# Patient Record
Sex: MALE | Race: WHITE | Employment: FULL TIME | URBAN - NONMETROPOLITAN AREA
[De-identification: names, ages, dates, MRNs, and addresses within clinical notes are randomized per-mention and may not be internally consistent; named-entity substitution may affect disease eponyms.]

---

## 2021-08-20 ENCOUNTER — HOSPITAL ENCOUNTER (OUTPATIENT)
Age: 31
Discharge: HOME | End: 2021-08-20
Payer: COMMERCIAL

## 2021-08-20 LAB
HEPATITIS B CORE IGM ANTIBODY: NONREACTIVE
HEPATITIS B SURFACE ANTIGEN: NONREACTIVE
HEPATITIS C ANTIBODY: NONREACTIVE
T. PALLIDUM, IGG: NONREACTIVE

## 2021-08-20 PROCEDURE — 87591 N.GONORRHOEAE DNA AMP PROB: CPT

## 2021-08-20 PROCEDURE — 87522 HEPATITIS C REVRS TRNSCRPJ: CPT

## 2021-08-20 PROCEDURE — 87340 HEPATITIS B SURFACE AG IA: CPT

## 2021-08-20 PROCEDURE — 87389 HIV-1 AG W/HIV-1&-2 AB AG IA: CPT

## 2021-08-20 PROCEDURE — 86705 HEP B CORE ANTIBODY IGM: CPT

## 2021-08-20 PROCEDURE — 86780 TREPONEMA PALLIDUM: CPT

## 2021-08-20 PROCEDURE — 87491 CHLMYD TRACH DNA AMP PROBE: CPT

## 2021-08-20 PROCEDURE — 86803 HEPATITIS C AB TEST: CPT

## 2021-08-20 PROCEDURE — 36415 COLL VENOUS BLD VENIPUNCTURE: CPT

## 2021-08-21 LAB — HIV AG/AB: NONREACTIVE

## 2021-08-23 LAB
C. TRACHOMATIS DNA ,URINE: NEGATIVE
DIRECT EXAM: NORMAL
Lab: NORMAL
N. GONORRHOEAE DNA, URINE: NEGATIVE
SPECIMEN DESCRIPTION: NORMAL
SPECIMEN DESCRIPTION: NORMAL

## 2022-03-14 ENCOUNTER — OFFICE VISIT (OUTPATIENT)
Dept: FAMILY MEDICINE CLINIC | Age: 32
End: 2022-03-14
Payer: COMMERCIAL

## 2022-03-14 VITALS
SYSTOLIC BLOOD PRESSURE: 92 MMHG | OXYGEN SATURATION: 98 % | BODY MASS INDEX: 23.3 KG/M2 | HEIGHT: 66 IN | DIASTOLIC BLOOD PRESSURE: 68 MMHG | WEIGHT: 145 LBS | HEART RATE: 56 BPM | RESPIRATION RATE: 14 BRPM

## 2022-03-14 DIAGNOSIS — G89.29 CHRONIC MIDLINE LOW BACK PAIN WITHOUT SCIATICA: Primary | ICD-10-CM

## 2022-03-14 DIAGNOSIS — M54.50 CHRONIC MIDLINE LOW BACK PAIN WITHOUT SCIATICA: Primary | ICD-10-CM

## 2022-03-14 DIAGNOSIS — I83.812 VARICOSE VEINS OF LEFT LOWER EXTREMITY WITH PAIN: ICD-10-CM

## 2022-03-14 PROCEDURE — 99204 OFFICE O/P NEW MOD 45 MIN: CPT | Performed by: NURSE PRACTITIONER

## 2022-03-14 SDOH — ECONOMIC STABILITY: FOOD INSECURITY: WITHIN THE PAST 12 MONTHS, THE FOOD YOU BOUGHT JUST DIDN'T LAST AND YOU DIDN'T HAVE MONEY TO GET MORE.: NEVER TRUE

## 2022-03-14 SDOH — ECONOMIC STABILITY: FOOD INSECURITY: WITHIN THE PAST 12 MONTHS, YOU WORRIED THAT YOUR FOOD WOULD RUN OUT BEFORE YOU GOT MONEY TO BUY MORE.: NEVER TRUE

## 2022-03-14 ASSESSMENT — PATIENT HEALTH QUESTIONNAIRE - PHQ9
SUM OF ALL RESPONSES TO PHQ QUESTIONS 1-9: 0
2. FEELING DOWN, DEPRESSED OR HOPELESS: 0
1. LITTLE INTEREST OR PLEASURE IN DOING THINGS: 0
SUM OF ALL RESPONSES TO PHQ9 QUESTIONS 1 & 2: 0
SUM OF ALL RESPONSES TO PHQ QUESTIONS 1-9: 0

## 2022-03-14 ASSESSMENT — ENCOUNTER SYMPTOMS: BACK PAIN: 1

## 2022-03-14 ASSESSMENT — SOCIAL DETERMINANTS OF HEALTH (SDOH): HOW HARD IS IT FOR YOU TO PAY FOR THE VERY BASICS LIKE FOOD, HOUSING, MEDICAL CARE, AND HEATING?: NOT HARD AT ALL

## 2022-03-14 NOTE — PATIENT INSTRUCTIONS
SURVEY:    You may be receiving a survey from [employer] regarding your visit today. Please complete the survey to enable us to provide the highest quality of care to you and your family. If you cannot score us a very good on any question, please call the office to discuss how we could of made your experience a very good one. Thank you.
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
## 2022-03-30 ENCOUNTER — HOSPITAL ENCOUNTER (OUTPATIENT)
Dept: GENERAL RADIOLOGY | Age: 32
Discharge: HOME | End: 2022-04-01
Payer: COMMERCIAL

## 2022-03-30 ENCOUNTER — OFFICE VISIT (OUTPATIENT)
Dept: VASCULAR SURGERY | Age: 32
End: 2022-03-30
Payer: COMMERCIAL

## 2022-03-30 ENCOUNTER — HOSPITAL ENCOUNTER (OUTPATIENT)
Age: 32
Discharge: HOME | End: 2022-04-01
Payer: COMMERCIAL

## 2022-03-30 ENCOUNTER — HOSPITAL ENCOUNTER (OUTPATIENT)
Dept: VASCULAR LAB | Age: 32
Discharge: HOME | End: 2022-04-01
Payer: COMMERCIAL

## 2022-03-30 VITALS
HEART RATE: 73 BPM | SYSTOLIC BLOOD PRESSURE: 126 MMHG | DIASTOLIC BLOOD PRESSURE: 60 MMHG | BODY MASS INDEX: 22.8 KG/M2 | RESPIRATION RATE: 18 BRPM | TEMPERATURE: 98.9 F | HEIGHT: 66 IN | WEIGHT: 141.9 LBS

## 2022-03-30 DIAGNOSIS — I83.812 VARICOSE VEINS OF LEFT LOWER EXTREMITY WITH PAIN: ICD-10-CM

## 2022-03-30 DIAGNOSIS — I83.813 VARICOSE VEINS OF BILATERAL LOWER EXTREMITIES WITH PAIN: Primary | ICD-10-CM

## 2022-03-30 DIAGNOSIS — G89.29 CHRONIC MIDLINE LOW BACK PAIN WITHOUT SCIATICA: ICD-10-CM

## 2022-03-30 DIAGNOSIS — M54.50 CHRONIC MIDLINE LOW BACK PAIN WITHOUT SCIATICA: ICD-10-CM

## 2022-03-30 PROCEDURE — 72100 X-RAY EXAM L-S SPINE 2/3 VWS: CPT

## 2022-03-30 PROCEDURE — 93971 EXTREMITY STUDY: CPT

## 2022-03-30 PROCEDURE — 99204 OFFICE O/P NEW MOD 45 MIN: CPT | Performed by: INTERNAL MEDICINE

## 2022-03-30 NOTE — PROGRESS NOTES
Facundo Hacth was seen on 3/30/2022 for   Chief Complaint   Patient presents with    New Patient     Patient here today due to pain in left leg due to varicose veins   . 3/30/22 1st [facility] Vascular visit for VV. Referred by Stephanie Monaco CNP. Has had trouble since 2013. Bulging painful vein. Grandmother had VV. He had stab phlebectomy left LATERAL knee, whereas painful vein is MEDIAL in [facility] in [facility]. No improvement. No persistent improvement with long term (>>3 mo) compression. Vein feels like it's on fire. Interferes with ADL. Interferes with sleep, work, etc.   [employer]  in [address]. On feet a lot. Duplex today showed reflux at LCFV, FV, and pop. There is also reflux at SFJ, THIGH 1389 5.1 MM, KNEE 1694, SSV 6.5 without reflux. AASV prox 5.7  3533, mid 3.1  4700, distal 2.8  4511. The painful vein described by pt at medial knee connects to aasv and is 3.2 mm with 4772  General health is good. Social History     Socioeconomic History    Marital status:      Spouse name: Not on file    Number of children: Not on file    Years of education: Not on file    Highest education level: Not on file   Occupational History    Not on file   Tobacco Use    Smoking status: Current Every Day Smoker     Packs/day: 0.50     Types: Cigarettes    Smokeless tobacco: Never Used   Vaping Use    Vaping Use: Never used   Substance and Sexual Activity    Alcohol use: Never    Drug use: Yes     Types: Marijuana Deboraha Sanes)    Sexual activity: Not on file   Other Topics Concern    Not on file   Social History Narrative    Not on file     Social Determinants of Health     Financial Resource Strain: Low Risk     Difficulty of Paying Living Expenses: Not hard at all   Food Insecurity: No Food Insecurity    Worried About Running Out of Food in the Last Year: Never true    Kahlil of Food in the Last Year: Never true   Transportation Needs:     Lack of Transportation (Medical):  Not on file    Lack of Transportation (Non-Medical): Not on file   Physical Activity:     Days of Exercise per Week: Not on file    Minutes of Exercise per Session: Not on file   Stress:     Feeling of Stress : Not on file   Social Connections:     Frequency of Communication with Friends and Family: Not on file    Frequency of Social Gatherings with Friends and Family: Not on file    Attends Judaism Services: Not on file    Active Member of [address] [place] [address] [employer] or Organizations: Not on file    Attends Club or Organization Meetings: Not on file    Marital Status: Not on file   Intimate Partner Violence:     Fear of Current or Ex-Partner: Not on file    Emotionally Abused: Not on file    Physically Abused: Not on file    Sexually Abused: Not on file   Housing Stability:     Unable to Pay for Housing in the Last Year: Not on file    Number of [place] in the Last Year: Not on file    Unstable Housing in the Last Year: Not on file     Family History   Problem Relation Age of Onset    Cancer Mother     High Blood Pressure Father     Stroke Father      History reviewed. No pertinent past medical history. No current outpatient medications on file. No current facility-administered medications for this visit. Physical findings:  General- no acute distress, oriented  HEENT - no xanthelasma, external ears normal  Neck- Supple, no thyromegaly  Carotids - no carotid bruits  Lungs - Clear to auscultation. CV- Regular rate and rythym, no murmurs rubs or gallops  Abdomen - Non tender, non distended, no bruits  Skin- warm, dry, no skin breakdown or gangrene  Extremities - no edema. Prominent ropey anterior saphenous wrapping medial to knee and extending distally. He described pain over anterior thigh veins and medial knee. Pulses Right - Radial 2+  Posterior tibial:    2+  Dorsalis pedis:  2+     Pulses Left -Radial 2+  Posterior tibial:    2+  Dorsalis pedis:  2+      Assessment:  Encounter Diagnosis   Name Primary?     Varicose veins of bilateral lower extremities with pain Yes     Painful varicose veins interfering with ADL and refractory to compression therapy. Plan foam sclerotherapy left anterior saphenous and branches. 4/12/22  Plan of care:  45 min chart review and pt encounter. Follow up and evaluate. rtc 1 mo       Electronically signed by Olimpia Mckeon MD on 3/30/22 at 1:58 PM EDT

## 2022-03-30 NOTE — PATIENT INSTRUCTIONS
SURVEY:    You may be receiving a survey from [employer] regarding your visit today. Please complete the survey to enable us to provide the highest quality of care to you and your family. If you cannot score us a very good on any question, please call the office to discuss how we could have made your experience a very good one. Thank you.

## 2022-03-30 NOTE — PROGRESS NOTES
Donaldo Pineda was seen on 3/30/2022 for   Chief Complaint   Patient presents with    New Patient     Patient here today due to pain in left leg due to varicose veins   .                             REVIEW OF SYSTEMS    Constitutional Weight: absent, A, Fatigue: absent Fever: absent   HEENT Ears: normal,Visual disturbance: absent Sore throat: absent,    Respiratory Shortness of breath: absent, Cough: absent;, Snoring: absent   Cardivascular Chest pain: absent,  Leg pain: present,Leg swelling:absent, Non-healing wound:absent    GI Diarrhea: absent, Abdominal Pain: absent    Urinary frequency: absent, Urinary incontinence: absent   Musculoskeletal Neck pain: absent, Back pain: absent, Restless Leg:absent     Dermatological Hair loss: absent, Skin changes: absent   Neurological  Sudden Loss of Vision in one eye:absent, Slurred Speech:absent, Weakness on one side of body: absent,Headache: absent   Psychiatric Anxiety: absent, Depression: absent   Hematologic Abnormal bleeding: absent,

## 2022-04-12 ENCOUNTER — APPOINTMENT (OUTPATIENT)
Dept: VASCULAR LAB | Age: 32
End: 2022-04-12
Attending: RADIOLOGY
Payer: COMMERCIAL

## 2022-04-12 ENCOUNTER — HOSPITAL ENCOUNTER (OUTPATIENT)
Dept: INTERVENTIONAL RADIOLOGY/VASCULAR | Age: 32
Discharge: HOME | End: 2022-04-12
Attending: RADIOLOGY | Admitting: INTERNAL MEDICINE
Payer: COMMERCIAL

## 2022-04-12 VITALS
DIASTOLIC BLOOD PRESSURE: 69 MMHG | HEART RATE: 50 BPM | WEIGHT: 140 LBS | OXYGEN SATURATION: 97 % | HEIGHT: 66 IN | SYSTOLIC BLOOD PRESSURE: 133 MMHG | TEMPERATURE: 98.4 F | RESPIRATION RATE: 22 BRPM | BODY MASS INDEX: 22.5 KG/M2

## 2022-04-12 DIAGNOSIS — I83.813 VARICOSE VEINS OF BILATERAL LOWER EXTREMITIES WITH PAIN: ICD-10-CM

## 2022-04-12 DIAGNOSIS — I83.819 VARICOSE VEINS WITH PAIN: ICD-10-CM

## 2022-04-12 PROCEDURE — 36470 NJX SCLRSNT 1 INCMPTNT VEIN: CPT

## 2022-04-12 PROCEDURE — 76942 ECHO GUIDE FOR BIOPSY: CPT

## 2022-04-12 PROCEDURE — 2709999900 IR ULTRASOUND GUIDANCE VASCULAR ACCESS

## 2022-04-12 PROCEDURE — 2500000003 HC RX 250 WO HCPCS

## 2022-04-12 PROCEDURE — 2500000003 HC RX 250 WO HCPCS: Performed by: INTERNAL MEDICINE

## 2022-04-12 RX ORDER — SODIUM TETRADECYL SULFATE 30 MG/ML
INJECTION, SOLUTION INTRAVENOUS
Status: COMPLETED | OUTPATIENT
Start: 2022-04-12 | End: 2022-04-12

## 2022-04-12 RX ORDER — BUPIVACAINE HYDROCHLORIDE 5 MG/ML
INJECTION, SOLUTION EPIDURAL; INTRACAUDAL
Status: COMPLETED | OUTPATIENT
Start: 2022-04-12 | End: 2022-04-12

## 2022-04-12 RX ADMIN — TETRADECYL HYDROGEN SULFATE (ESTER) 2 ML: 30 INJECTION, SOLUTION INTRAVENOUS at 09:10

## 2022-04-12 RX ADMIN — BUPIVACAINE HYDROCHLORIDE 2 ML: 5 INJECTION, SOLUTION EPIDURAL; INTRACAUDAL; PERINEURAL at 09:08

## 2022-04-12 ASSESSMENT — PAIN - FUNCTIONAL ASSESSMENT: PAIN_FUNCTIONAL_ASSESSMENT: 0-10

## 2022-04-12 NOTE — OR NURSING
Doctor discusses results of procedure and instructions with patient. Patient tolerated procedure well.

## 2022-04-12 NOTE — OR NURSING
Ultrasound guided vascular access continues to be attempted by doctor and US tech to left upper thigh area. Patient tolerating procedure well.

## 2022-04-12 NOTE — PROCEDURES
[address], [place] [address]                            CARDIAC CATHETERIZATION    PATIENT NAME: Kraig SKELTON                       :        1990  MED REC NO:   [id number]                              ROOM:  ACCOUNT NO:   [de-identified]                           ADMIT DATE: 2022  PROVIDER:     Sujey Alexander    DATE OF PROCEDURE:  2022    FINAL IMPRESSION:  Successful Sotradecol foam sclerotherapy of the left  anterior saphenous vein. ESTIMATED BLOOD LOSS:  3 mL. PROCEDURE:  Ultrasound-guided foam sclerotherapy. METHOD:  Written informed consent was obtained. The left anterior  saphenous vein was identified under ultrasound and using micropuncture  technique, an inner cannula was introduced into the vein over a  guidewire. An intraluminal location was confirmed by ultrasound and by  aspiration. Sotradecol 3% foam was prepared using the Tessari method  using 2 mL of foam and 8 mL of air prepared using the Tessari technique. This was then injected under ultrasound guidance into the left anterior  saphenous vein. A confirmation of appropriate location of the foam was  performed using ultrasound. After the procedure, the patient was placed  in a compression stocking and was instructed to walk at least 30 minutes  daily. Arrangements were made for followup ultrasound and a clinic  visit. COMMENT:  The patient is a 70-year-old gentleman with severe pain in a  distended left anterior saphenous vein, which interferes with his  activities of daily living. The current procedure was performed in  order to reduce pain.         Elen Foreman    D: 2022 9:35:54       T: 2022 9:39:33     [id number]  Job#: [id number]     Doc#: [id number]    CC:

## 2022-04-25 ENCOUNTER — HOSPITAL ENCOUNTER (OUTPATIENT)
Dept: VASCULAR LAB | Age: 32
Discharge: HOME | End: 2022-04-27
Payer: COMMERCIAL

## 2022-04-25 DIAGNOSIS — I83.813 VARICOSE VEINS OF BILATERAL LOWER EXTREMITIES WITH PAIN: ICD-10-CM

## 2022-04-25 DIAGNOSIS — I83.819 VARICOSE VEINS WITH PAIN: ICD-10-CM

## 2022-04-25 PROCEDURE — 93971 EXTREMITY STUDY: CPT

## 2022-05-09 ENCOUNTER — OFFICE VISIT (OUTPATIENT)
Dept: FAMILY MEDICINE CLINIC | Age: 32
End: 2022-05-09
Payer: COMMERCIAL

## 2022-05-09 VITALS
TEMPERATURE: 98.5 F | DIASTOLIC BLOOD PRESSURE: 65 MMHG | SYSTOLIC BLOOD PRESSURE: 118 MMHG | WEIGHT: 147 LBS | BODY MASS INDEX: 23.63 KG/M2 | HEART RATE: 87 BPM | HEIGHT: 66 IN | OXYGEN SATURATION: 97 % | RESPIRATION RATE: 16 BRPM

## 2022-05-09 DIAGNOSIS — I83.812 VARICOSE VEINS OF LEFT LOWER EXTREMITY WITH PAIN: ICD-10-CM

## 2022-05-09 DIAGNOSIS — M71.329 SYNOVIAL CYST OF ELBOW: ICD-10-CM

## 2022-05-09 DIAGNOSIS — M51.36 DDD (DEGENERATIVE DISC DISEASE), LUMBAR: Primary | ICD-10-CM

## 2022-05-09 PROCEDURE — 99214 OFFICE O/P EST MOD 30 MIN: CPT | Performed by: NURSE PRACTITIONER

## 2022-05-09 ASSESSMENT — ENCOUNTER SYMPTOMS: BACK PAIN: 1

## 2022-05-09 ASSESSMENT — PATIENT HEALTH QUESTIONNAIRE - PHQ9
SUM OF ALL RESPONSES TO PHQ QUESTIONS 1-9: 0
2. FEELING DOWN, DEPRESSED OR HOPELESS: 0
SUM OF ALL RESPONSES TO PHQ QUESTIONS 1-9: 0
SUM OF ALL RESPONSES TO PHQ QUESTIONS 1-9: 0
1. LITTLE INTEREST OR PLEASURE IN DOING THINGS: 0
SUM OF ALL RESPONSES TO PHQ9 QUESTIONS 1 & 2: 0
SUM OF ALL RESPONSES TO PHQ QUESTIONS 1-9: 0

## 2022-05-09 NOTE — PATIENT INSTRUCTIONS
SURVEY:    You may be receiving a survey from [employer] regarding your visit today. Please complete the survey to enable us to provide the highest quality of care to you and your family. If you cannot score us a very good on any question, please call the office to discuss how we could of made your experience a very good one. Thank you.
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
## 2022-05-10 RX ORDER — MELOXICAM 7.5 MG/1
7.5 TABLET ORAL DAILY PRN
Qty: 30 TABLET | Refills: 1 | Status: SHIPPED | OUTPATIENT
Start: 2022-05-10 | End: 2022-06-03

## 2022-05-13 PROBLEM — M51.369 DDD (DEGENERATIVE DISC DISEASE), LUMBAR: Status: ACTIVE | Noted: 2022-05-13

## 2022-05-13 PROBLEM — M51.36 DDD (DEGENERATIVE DISC DISEASE), LUMBAR: Status: ACTIVE | Noted: 2022-05-13

## 2022-05-16 ENCOUNTER — HOSPITAL ENCOUNTER (OUTPATIENT)
Dept: PHYSICAL THERAPY | Age: 32
Setting detail: THERAPIES SERIES
Discharge: HOME | End: 2022-05-16
Payer: COMMERCIAL

## 2022-05-16 PROCEDURE — 97140 MANUAL THERAPY 1/> REGIONS: CPT

## 2022-05-16 PROCEDURE — 97162 PT EVAL MOD COMPLEX 30 MIN: CPT

## 2022-05-16 NOTE — PLAN OF CARE
[facility]           Phone: [phone]             Outpatient Physical Therapy  Fax: [phone]                                           Date: 2022  Patient: Tierra Diaz : 1990 [facility] #: [id number]   Referring Physician: AVNI Murray *  Referral Date:          [x] Plan of Care   [] Updated Plan of Care    Dates of Service to Include: 2022 to 22    Diagnosis:  DDD, lumbar (M51.36)    Rehab (Treatment) Diagnosis:  R-side low back pain             Onset Date:  22    Attendance  Total # of Visits to Date: 1 No Show: 0 Canceled Appointment: 0    Assessment  Assessment: Pt is a 34 y/o male who presents to PT with chronic LBP, which has been progressive in nature. Pt currently demonstrates TTP through R-side lumbar paraspinals and QL. Pt demonstrates functional trunk flex ROM, but evidence of left-side deviation with lumbar extension and minimal restriction with L sidebend compared bilaterally. Pt demonstrates full hip ROM and demonstrates 5/5 hip flex strength bilaterally, 4+/5 hip ext strength bilaterally. Pt reports no significant change in symptoms with manual lumbar traction. Pt wll benefit from skilled PT services to improve trunk ROM, decrease TTP and improve overall functional mobility. Goals  Short Term Goals  Time Frame for Short term goals: 3 weeks  Short term goal 1: Pt will initiate HEP. Short term goal 2: Pt will initiate manual techniques/modalities PRN to assist with improving mobility and decreasing pain. Long Term Goals  Time Frame for Long term goals : 6 weeks  Long term goal 1: Pt will be independent and compliant with HEP. Long term goal 2: Pt will have no TTP through R lumbar paraspinals and QL to decrease soft tissue sensitivity. Long term goal 3: Pt will report >/= 50% improvement in pain and overall function to improve QOL.      Prognosis  Therapy Prognosis: Good    Treatment Plan   Plan Frequency: 1 (Works out of town Tues-Fri.)  Plan weeks: 6  [x] HP/CP      [x] Electrical Stim   [x] Therapeutic Exercise      [] Gait Training  [] Aquatics   [] Ultrasound         [x] Patient Education/HEP   [x] Manual Therapy  [x] Traction    [x] Neuro-mehreen        [x] Soft Tissue Mobs            [] Home TENS  [] Iontophoresis    [] Orthotic casting/fitting      [x] Dry Needling             Electronically signed by: Leny Edgar PT, DPT    Date: 5/16/2022      ______________________________________ Date: 5/16/2022   Physician Signature

## 2022-05-16 NOTE — PROGRESS NOTES
Phone: [address] [facility]          Fax: [phone]                      Outpatient Physical Therapy                                                                      Evaluation  Date: 2022  Patient: Marisol Pinto  : 1990  CSN #: [id number]    Referring Physician: AVNI Capellan *     Medical Diagnosis: DDD, lumbar (M51.36)    Treatment Diagnosis: R-side low back pain  Onset Date: 22  PT Insurance Information: BCBS  Total # of Visits Approved: 6   Total # of Visits to Date: 1  No Show: 0  Canceled Appointment: 0     Subjective  Subjective: Pt states he has had low back pain since ~. Pt states he was in the [employer] for 8-9 years and believes that's when he had a lot of wear and tear on his back. Pt states standing, walking, bending, lifting are all aggravating to low back pain. 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   Additional Pertinent Hx: Anxiety, depression, current smoker    Palpation:   Lumbar Spine Palpation: Mod TTP R lumbar paraspinals and R QL      Strength       Strength LLE  L Hip Flexion: 5/5  L Hip Extension: 4+/5     Lumbar Assessment     AROM Lumbar Spine   Lumbar spine general AROM: Flex WNL; ext indicates deviation to L; sidebend restricted on L when compared bilaterally       Special Tests:   Special Tests Lumbar Spine  Lumbar Special Tests: Performed  Prone Knee Bend Test: R (-),L (-)  SLR: L (-),R (-)                   Strength RLE  R Hip Flexion: 5/5  R Hip Extension: 4+/5  Strength LLE  L Hip Flexion: 5/5  L Hip Extension: 4+/5       Exercises:  Exercise 1: HEP: evens pose, lateral chilsd pose    Manual:  Manual Traction: Manual lumbar traction with BLE's on PB  Soft Tissue Mobilizaton: Manual STM to R-side lumbar paraspinals and QL following IDN  Other: IDN R-side lumbar paraspinals and QL       Modalities:    MHP x10min low back--- seated     Functional Outcome Measures      Self-reported disability= 20%         Assessment  Assessment: Pt is a 32 y/o male who presents to PT with chronic LBP, which has been progressive in nature. Pt currently demonstrates TTP through R-side lumbar paraspinals and QL. Pt demonstrates functional trunk flex ROM, but evidence of left-side deviation with lumbar extension and minimal restriction with L sidebend compared bilaterally. Pt demonstrates full hip ROM and demonstrates 5/5 hip flex strength bilaterally, 4+/5 hip ext strength bilaterally. Pt reports no significant change in symptoms with manual lumbar traction. Pt wll benefit from skilled PT services to improve trunk ROM, decrease TTP and improve overall functional mobility. Therapy Prognosis: Good        Decision Making: Medium Complexity    Patient Education  *Patient Education: PT POC and HEP. Also educated on purpose of IDN. Pt verbalized/demonstrated good understanding:     [X] Yes         [] No, pt required further clarification. Goals  Short Term Goals  Time Frame for Short term goals: 3 weeks  Short term goal 1: Pt will initiate HEP. Short term goal 2: Pt will initiate manual techniques/modalities PRN to assist with improving mobility and decreasing pain. Long Term Goals  Time Frame for Long term goals : 6 weeks  Long term goal 1: Pt will be independent and compliant with HEP. Long term goal 2: Pt will have no TTP through R lumbar paraspinals and QL to decrease soft tissue sensitivity. Long term goal 3: Pt will report >/= 50% improvement in pain and overall function to improve QOL. Patient goals :  \"To be able to make the pain manageable\"        Minutes Tracking:  Time In: 1304  Time Out: 1350  Minutes: 46  Timed Code Treatment Minutes: [address] Nuno Diaz, DPBRITNEY    5/16/2022

## 2022-05-23 ENCOUNTER — HOSPITAL ENCOUNTER (OUTPATIENT)
Dept: PHYSICAL THERAPY | Age: 32
Setting detail: THERAPIES SERIES
Discharge: HOME | End: 2022-05-23
Payer: COMMERCIAL

## 2022-05-23 PROCEDURE — 97140 MANUAL THERAPY 1/> REGIONS: CPT

## 2022-05-23 PROCEDURE — G0283 ELEC STIM OTHER THAN WOUND: HCPCS

## 2022-05-23 PROCEDURE — 97110 THERAPEUTIC EXERCISES: CPT

## 2022-05-23 NOTE — PROGRESS NOTES
Phone: [facility]           Fax: [phone]                           Outpatient Physical Therapy                                                                            Daily Note    Patient: Sivlano Otoole : 1990  CSN #: [id number]   Referring Physician: AVNI Starks *    Date: 2022    Diagnosis: DDD, lumbar (M51.36)  Treatment Diagnosis: R-side low back pain    Onset Date: 22  PT Insurance Information: BCBS  Total # of Visits Approved: 6 Per Physician Order  Total # of Visits to Date: 2  No Show: 0  Canceled Appointment: 0      Pre-Treatment Pain:  4/10  Subjective: Pt states pain level 4/10 upon arrival this date. Pt states he did get some relief following 1st visit. Exercises:  Exercise 1: HEP: evens pose, lateral evens pose  Exercise 2: Evens pose stretch [id number]; lat evens pose stretch  Exercise 3: Seated PB rollouts [id number] ea direction    Manual:  Soft Tissue Mobilizaton: Warm thermoprobe to lumbar paraspinals and R QL following IDN  Other: IDN bilateral lumbar paraspinals    Modalities:   IFC+MHP x15min low back for pain    Assessment  Assessment: Pt reports good feedback following IDN last visit, therefore cont with IDN for cont benefits. Initiated warm thermoprobe following IDN to assist with STM. Reviewed HEP for overall understanding. Initiated IFC and MHP at end of tx for pain. Will progress as tolerated. Will progress as tolerated. Activity Tolerance  Activity Tolerance: Patient tolerated treatment well    Patient Education  *Patient Education: Reviewed manual techniques and HEP  Pt verbalized/demonstrated good understanding:     [x] Yes         [] No, pt required further clarification. Post Treatment Pain:  4/10      Plan  Plan Frequency: 1  Plan weeks: 6       Goals  (Total # of Visits to Date: 2)      Short Term Goals  Time Frame for Short term goals: 3 weeks  Short term goal 1: Pt will initiate HEP. - met  Short term goal 2: Pt will initiate manual techniques/modalities PRN to assist with improving mobility and decreasing pain. - met/cont    Long Term Goals  Time Frame for Long term goals : 6 weeks  Long term goal 1: Pt will be independent and compliant with HEP. Long term goal 2: Pt will have no TTP through R lumbar paraspinals and QL to decrease soft tissue sensitivity. Long term goal 3: Pt will report >/= 50% improvement in pain and overall function to improve QOL.     Minutes Tracking:  Time In: 6105  Time Out: 1034  Minutes: 53  Timed Code Treatment Minutes: [id number], [place], DPT      Date: 5/23/2022

## 2022-06-03 ENCOUNTER — OFFICE VISIT (OUTPATIENT)
Dept: PRIMARY CARE CLINIC | Age: 32
End: 2022-06-03
Payer: COMMERCIAL

## 2022-06-03 ENCOUNTER — APPOINTMENT (OUTPATIENT)
Dept: PHYSICAL THERAPY | Age: 32
End: 2022-06-03
Payer: COMMERCIAL

## 2022-06-03 VITALS
WEIGHT: 146 LBS | DIASTOLIC BLOOD PRESSURE: 78 MMHG | SYSTOLIC BLOOD PRESSURE: 122 MMHG | HEART RATE: 60 BPM | OXYGEN SATURATION: 98 % | HEIGHT: 66 IN | BODY MASS INDEX: 23.46 KG/M2

## 2022-06-03 DIAGNOSIS — L29.9 ITCHING: ICD-10-CM

## 2022-06-03 DIAGNOSIS — L23.7 POISON IVY DERMATITIS: Primary | ICD-10-CM

## 2022-06-03 PROCEDURE — 99213 OFFICE O/P EST LOW 20 MIN: CPT | Performed by: STUDENT IN AN ORGANIZED HEALTH CARE EDUCATION/TRAINING PROGRAM

## 2022-06-03 RX ORDER — PREDNISONE 20 MG/1
20 TABLET ORAL DAILY
Qty: 18 TABLET | Refills: 0 | Status: SHIPPED | OUTPATIENT
Start: 2022-06-03 | End: 2022-06-18

## 2022-06-03 RX ORDER — HYDROXYZINE HYDROCHLORIDE 25 MG/1
25 TABLET, FILM COATED ORAL EVERY 8 HOURS PRN
Qty: 42 TABLET | Refills: 0 | Status: SHIPPED | OUTPATIENT
Start: 2022-06-03 | End: 2022-06-03

## 2022-06-03 RX ORDER — HYDROXYZINE PAMOATE 25 MG/1
25 CAPSULE ORAL 4 TIMES DAILY PRN
Qty: 56 CAPSULE | Refills: 0 | Status: SHIPPED | OUTPATIENT
Start: 2022-06-03 | End: 2022-06-17

## 2022-06-03 RX ORDER — BETAMETHASONE DIPROPIONATE 0.5 MG/G
OINTMENT TOPICAL 2 TIMES DAILY
Qty: 45 G | Refills: 1 | Status: SHIPPED | OUTPATIENT
Start: 2022-06-03 | End: 2022-06-10

## 2022-06-03 RX ORDER — CLOBETASOL PROPIONATE 0.5 MG/G
OINTMENT TOPICAL 2 TIMES DAILY
Qty: 45 G | Refills: 0 | Status: SHIPPED | OUTPATIENT
Start: 2022-06-03 | End: 2022-06-03

## 2022-06-03 NOTE — PROGRESS NOTES
[address], [address] , [facility]  1990 is a 33 y.o. male, Established patient, here for evaluation of the following chief complaint(s):    Poison Ivy       ASSESSMENT/PLAN:      1. Poison ivy dermatitis  -     predniSONE (DELTASONE) 20 MG tablet; Take 1 tablet by mouth daily for 15 days Take 2X20mg tablets (40mg) PO daily for 5 days, followed by 20mg (1) Tablets for 5 days and then 10mg (0.5 tablet) for 5 days Total, Disp-18 tablet, R-0Normal  -     augmented betamethasone dipropionate (DIPROLENE-AF) 0.05 % ointment; Apply topically 2 times daily for 7 days BETHAMETHASONE DP AUG 0.05% OIN alternative, Ok to substitute with what is available and/or per Pharmacist/Insurance preference/coverage please, thank you., Topical, 2 TIMES DAILY Starting Fri 6/3/2022, Until Fri 6/10/2022, For 7 days, Disp-45 g, R-1, Normal  2. Itching  -     hydrOXYzine pamoate (VISTARIL) 25 MG capsule; Take 1 capsule by mouth 4 times daily as needed for Itching, Disp-56 capsule, R-0Normal     Patient is rash today appears to be contact poison ivy dermatitis with some blistering on the inner aspect of the upper arms. We may consider other etiologies as well if there is no resolution of symptoms, including possible biopsy lesions and or dermatology referral if needed. Discussed the risk benefits and alternatives to using these medications with the patient today. Atarax as needed for itching, prednisone with taper, topical clobetasol as needed. Medications Discontinued During This Encounter   Medication Reason    meloxicam (MOBIC) 7.5 MG tablet LIST CLEANUP    clobetasol (TEMOVATE) 0.05 % ointment Cost of medication    hydrOXYzine (ATARAX) 25 MG tablet Cost of medication        Return in about 2 weeks (around 6/17/2022) for F/U Rash. [facility] received counseling on the following healthy behaviors: nutrition, exercise and medication adherence. I encouraged and discussed lifestyle modifications including diet and exercise and the patient was agreeable to making positive/beneficial changes to both to help improve their overall health. Discussed use, benefit, and side effects of prescribed medications. Barriers to medication compliance addressed. Patient given educational materials: see patient instructions. HM - HM items completed today as per orders. Outstanding HM items though not limited to immunizations were discussed with the patient today, including risks, benefits and alternatives. The patient will discuss these during the next appointment per their preference. If there are any worsening or concerning signs or symptoms, patient will report to the ED and/or contact [id number] for immediate evaluation. Teach back method was used. All patient questions answered. Pt voiced understanding. Subjective    SUBJECTIVE/OBJECTIVE:    HPI     Poison Caitlin  for 5 days  Ed Amass of Onset and Mechanism -  sudden, rapid, worsening  Location - rash lotcated bilateral arms , inner thigh and abdomen  Characteristics/Radiation/Quality - the patient was doing yard work on sunday and came in contact with poison ivy. It it very itchy and painful, excoriated and some sites are bleeding at this time. There are some areas of blistering and leakage present. He has noticed an additional area of rash on his abdomen as well. He had one prior episode in childhood. Nobody else around him has the rash present. Severity (0-10) - 10/10 at times  Aggravating Factors - moving around makes it worse  Relieving Factors/Treatment tried - he has tried calimine lotion and oatmeal bath and ivy otc itch meds with minimal relief of his symptoms.     Family History   Problem Relation Age of Onset    Cancer Mother     High Blood Pressure Father     Stroke Father        Health Maintenance   Alcohol/Substance use History - None/Minimal  Smoking History    Tobacco Use      Smoking status: Current Every Day Smoker        Packs/day: 0.50        Types: Cigarettes      Smokeless tobacco: Never Used      Health Maintenance   Topic Date Due    Pneumococcal 0-64 years Vaccine (1 - PCV) Never done    DTaP/Tdap/Td vaccine (7 - Td or Tdap) 03/14/2023 (Originally 8/23/2020)    Flu vaccine (Season Ended) 03/14/2023 (Originally 9/1/2022)    COVID-19 Vaccine (3 - Booster for Pfizer series) 01/01/2024 (Originally 2/11/2022)    Depression Screen  05/09/2023    Hepatitis B vaccine  Completed    Hib vaccine  Completed    Hepatitis C screen  Completed    HIV screen  Completed    Hepatitis A vaccine  Aged Out    Meningococcal (ACWY) vaccine  Aged Out    Varicella vaccine  Discontinued      Depression Screening  PHQ Scores 5/9/2022 3/14/2022   PHQ2 Score 0 0   PHQ9 Score 0 0     Interpretation of Total Score Depression Severity: 1-4 = Minimal depression, 5-9 = Mild depression, 10-14 = Moderate depression, 15-19 = Moderately severe depression, 20-27 = Severe depression      Review of Systems   Constitutional: Negative for activity change, appetite change, chills, diaphoresis, fatigue, fever and unexpected weight change. HENT: Negative for sinus pressure, sinus pain, sore throat and trouble swallowing. Respiratory: Negative for cough, shortness of breath and wheezing. Cardiovascular: Negative for chest pain, palpitations and leg swelling. Gastrointestinal: Negative for abdominal pain, diarrhea, nausea and vomiting. Endocrine: Negative for cold intolerance, polydipsia, polyphagia and polyuria. Genitourinary: Negative for difficulty urinating, flank pain and frequency. Musculoskeletal: Negative for gait problem and joint swelling. Negative for back pain, neck pain and neck stiffness. Skin: Negative for color change and wound. Negative for pallor. Positive for rash. Allergic/Immunologic: Negative for environmental allergies and food allergies.    Neurological: Negative for light-headedness, numbness and headaches. Psychiatric/Behavioral: Negative for sleep disturbance. Negative for confusion and suicidal ideas. Objective    Physical Exam   Constitutional: Patient is oriented to person, place, and time. Patient appears well-developed and well-nourished. No distress. HENT: Head: Normocephalic and atraumatic. Eyes: Pupils are equal, round, and reactive to light. Conjunctivae are normal. Right eye exhibits no discharge. Left eye exhibits no discharge. Cardiovascular: Normal rate, regular rhythm and normal heart sounds. Pulmonary/Chest: Effort normal and breath sounds normal. No respiratory distress. Patient has no wheezes. Abdominal: Soft. Bowel sounds are normal. Patient exhibits no distension. There is no tenderness. Musculoskeletal:  Patient exhibits no edema and tenderness. Patient exhibits no deformity. Neurological: Patient is alert and oriented to person, place, and time. Skin: Skin is warm and dry. Patient is not diaphoretic. Positive for rash, streaky, patchy rashes on the forearms, inner thighs and abdomen. Psychiatric: Patient's speech is normal and behavior is normal. Thought content normal.   Vitals reviewed. /78   Pulse 60   Ht 5' 6\" (1.676 m)   Wt 146 lb (66.2 kg)   SpO2 98%   BMI 23.57 kg/m²   BP Readings from Last 3 Encounters:   06/03/22 122/78   05/09/22 118/65   04/12/22 133/69     No results found for: WBC, HGB, HCT, PLT, CHOL, TRIG, HDL, LDLDIRECT, ALT, AST, NA, K, CL, CREATININE, BUN, CO2, TSH, PSA, INR, GLUF, LABA1C, LABMICR  No results found for: CALCIUM, PHOS  No results found for: LDLCALC, LDLCHOLESTEROL, LDLDIRECT    CURRENT MEDS W/ ASSOC DIAG         Start Date End Date     meloxicam (MOBIC) 7.5 MG tablet  05/10/22  --     Take 1 tablet by mouth daily as needed for Pain . Take with food.      Patient not taking: Reported on 6/3/2022     Associated Diagnoses:  --            Please note that this chart was generated using voice recognition Dragon dictation software. Although every effort was made to ensure the accuracy of this automated transcription, some errors in transcription may have occurred.     Electronically signed by James Guajardo MD on 6/3/22 at 5:30 PM

## 2022-06-06 ENCOUNTER — APPOINTMENT (OUTPATIENT)
Dept: PHYSICAL THERAPY | Age: 32
End: 2022-06-06
Payer: COMMERCIAL

## 2022-06-13 ENCOUNTER — HOSPITAL ENCOUNTER (OUTPATIENT)
Dept: PHYSICAL THERAPY | Age: 32
Setting detail: THERAPIES SERIES
Discharge: HOME | End: 2022-06-13
Payer: COMMERCIAL

## 2022-06-13 PROCEDURE — 97110 THERAPEUTIC EXERCISES: CPT

## 2022-06-13 NOTE — PROGRESS NOTES
Phone: [facility]           Fax: [phone]                           Outpatient Physical Therapy                                                                            Daily Note    Patient: Dorene Yeager : 1990  CSN #: [id number]   Referring Physician: AVNI Snyder *    Date: 2022    Diagnosis: DDD, lumbar (M51.36)  Treatment Diagnosis: R-side low back pain    Onset Date: 22  PT Insurance Information: BCBS  Total # of Visits Approved: 6 Per Physician Order  Total # of Visits to Date: 3  No Show: 0  Canceled Appointment: 0      Pre-Treatment Pain:  0/10  Subjective: Pt denies pain upon arrival, stating his back has been doing well lately. Pt reports compliance with HEP without any issues/concerns. Exercises:  Exercise 1: HEP: evens pose, lateral evens pose; updated SKTC stretch, DKTC stretch  Exercise 2: Evens pose stretch [id number]; lat evens pose stretch  Exercise 3: Seated PB rollouts [id number] ea direction  Exercise 4: SKTC stretch; DKTC stretch      Assessment  Assessment: Pt has completed 3 PT visits to date. Pt is currently independent and compliant with HEP. Pt has no TTP through lumbar paraspinals. Pt reports >50% improvement in pain and overall function since initiation of PT. Reviewed/updated HEP this date. Will now D/C from PT. Activity Tolerance  Activity Tolerance: Patient tolerated treatment well    Patient Education  Patient Education: HEP and plan for D/C  Pt verbalized/demonstrated good understanding:     [x] Yes         [] No, pt required further clarification. Post Treatment Pain:  0/10      Plan  Plan Frequency: 1  Plan weeks: 6       Goals  (Total # of Visits to Date: 3)      Short Term Goals  Time Frame for Short term goals: 3 weeks  Short term goal 1: Pt will initiate HEP. - met  Short term goal 2: Pt will initiate manual techniques/modalities PRN to assist with improving mobility and decreasing pain. - met/cont    Long Term Goals  Time Frame for Long term goals : 6 weeks  Long term goal 1: Pt will be independent and compliant with HEP. - met  Long term goal 2: Pt will have no TTP through R lumbar paraspinals and QL to decrease soft tissue sensitivity. - met  Long term goal 3: Pt will report >/= 50% improvement in pain and overall function to improve QOL. - met    Minutes Tracking:  Time In: 1025  Time Out: [address]  Minutes: 20  Timed Code Treatment Minutes: Nuno Barry DPT      Date: 6/13/2022

## 2022-06-13 NOTE — DISCHARGE SUMMARY
Phone: [facility]          Fax: [phone]                            Outpatient Physical Therapy                                                                    Discharge Summary    Patient: Jessica Clarke  : 1990  CSN #: [id number]   Referring Physician: AVNI Del Angel *   Diagnosis: DDD, lumbar (M51.36)  Treatment Diagnosis: R-side low back pain      Date Treatment Initiated: 22  Date of Last Treatment: 22      PT Visit Information  Onset Date: 22  PT Insurance Information: BCBS  Total # of Visits Approved: 6  Total # of Visits to Date: 3  Plan of Care/Certification Expiration Date: 22  No Show: 0  Canceled Appointment: 0      Frequency/Duration  Plan Frequency: 1 times per week  Plan weeks: 6 weeks      Treatment Received  [x] HP/CP      [x] Electrical Stim   [x] Therapeutic Exercise      [] Gait Training  [] Aquatics   [] Ultrasound         [x] Patient Education/HEP   [x] Manual Therapy  [] Traction    [] Neuro-mehreen        [x] Soft Tissue Mobs            [] Home TENS  [] Iontophoresis    [] Orthotic casting/fitting      [x] Dry Needling    Assessment  Assessment: Pt has completed 3 PT visits to date. Pt is currently independent and compliant with HEP. Pt has no TTP through lumbar paraspinals. Pt reports >50% improvement in pain and overall function since initiation of PT. Reviewed/updated HEP this date. Will now D/C from PT. Goals  Short Term Goals  Time Frame for Short term goals: 3 weeks  Short term goal 1: Pt will initiate HEP. - met  Short term goal 2: Pt will initiate manual techniques/modalities PRN to assist with improving mobility and decreasing pain. - met/cont    Long Term Goals  Time Frame for Long term goals : 6 weeks  Long term goal 1: Pt will be independent and compliant with HEP. - met  Long term goal 2: Pt will have no TTP through R lumbar paraspinals and QL to decrease soft tissue sensitivity. - met  Long term goal 3: Pt will report >/= 50% improvement in pain and overall function to improve QOL. - met      Reason for Discharge  [x] Goals Achieved                        []  Poor Follow Through/Attendance                  [x]  Optimal Function Achieved     []  Patient Discharged Self    []  Hospitalization                         []  Physician discharge      Thank you for this referral      Juan Carlos Monahan, PT, DPT               Date: 6/13/2022

## 2022-09-12 ENCOUNTER — OFFICE VISIT (OUTPATIENT)
Dept: PRIMARY CARE CLINIC | Age: 32
End: 2022-09-12
Payer: COMMERCIAL

## 2022-09-12 ENCOUNTER — HOSPITAL ENCOUNTER (OUTPATIENT)
Age: 32
Setting detail: SPECIMEN
Discharge: HOME | End: 2022-09-12
Payer: COMMERCIAL

## 2022-09-12 VITALS
RESPIRATION RATE: 14 BRPM | TEMPERATURE: 97.3 F | DIASTOLIC BLOOD PRESSURE: 70 MMHG | WEIGHT: 147 LBS | HEART RATE: 69 BPM | OXYGEN SATURATION: 99 % | HEIGHT: 66 IN | BODY MASS INDEX: 23.63 KG/M2 | SYSTOLIC BLOOD PRESSURE: 116 MMHG

## 2022-09-12 DIAGNOSIS — Z00.00 WELLNESS EXAMINATION: Primary | ICD-10-CM

## 2022-09-12 DIAGNOSIS — Z00.00 WELLNESS EXAMINATION: ICD-10-CM

## 2022-09-12 DIAGNOSIS — M51.36 DDD (DEGENERATIVE DISC DISEASE), LUMBAR: ICD-10-CM

## 2022-09-12 DIAGNOSIS — I83.812 VARICOSE VEINS OF LEFT LOWER EXTREMITY WITH PAIN: ICD-10-CM

## 2022-09-12 DIAGNOSIS — R73.09 ELEVATED GLUCOSE: ICD-10-CM

## 2022-09-12 DIAGNOSIS — Z13.220 LIPID SCREENING: ICD-10-CM

## 2022-09-12 LAB
ALT SERPL-CCNC: 21 U/L (ref 5–41)
ANION GAP SERPL CALCULATED.3IONS-SCNC: 10 MMOL/L (ref 9–17)
AST SERPL-CCNC: 17 U/L
BUN BLDV-MCNC: 13 MG/DL (ref 6–20)
BUN/CREAT BLD: 13 (ref 9–20)
CALCIUM SERPL-MCNC: 9.2 MG/DL (ref 8.6–10.4)
CHLORIDE BLD-SCNC: 104 MMOL/L (ref 98–107)
CHOLESTEROL/HDL RATIO: 3.6
CHOLESTEROL: 163 MG/DL
CO2: 27 MMOL/L (ref 20–31)
CREAT SERPL-MCNC: 1.03 MG/DL (ref 0.7–1.2)
ESTIMATED AVERAGE GLUCOSE: 117 MG/DL
GFR AFRICAN AMERICAN: >60 ML/MIN
GFR NON-AFRICAN AMERICAN: >60 ML/MIN
GFR SERPL CREATININE-BSD FRML MDRD: NORMAL ML/MIN/{1.73_M2}
GFR SERPL CREATININE-BSD FRML MDRD: NORMAL ML/MIN/{1.73_M2}
GLUCOSE BLD-MCNC: 99 MG/DL (ref 70–99)
HBA1C MFR BLD: 5.7 % (ref 4–6)
HCT VFR BLD CALC: 44.4 % (ref 40.7–50.3)
HDLC SERPL-MCNC: 45 MG/DL
HEMOGLOBIN: 14.7 G/DL (ref 13–17)
LDL CHOLESTEROL: 104 MG/DL (ref 0–130)
MCH RBC QN AUTO: 31.8 PG (ref 25.2–33.5)
MCHC RBC AUTO-ENTMCNC: 33.1 G/DL (ref 28.4–34.8)
MCV RBC AUTO: 96.1 FL (ref 82.6–102.9)
NRBC AUTOMATED: 0 PER 100 WBC
PDW BLD-RTO: 12.2 % (ref 11.8–14.4)
PLATELET # BLD: 235 K/UL (ref 138–453)
PMV BLD AUTO: 12 FL (ref 8.1–13.5)
POTASSIUM SERPL-SCNC: 4.1 MMOL/L (ref 3.7–5.3)
RBC # BLD: 4.62 M/UL (ref 4.21–5.77)
SODIUM BLD-SCNC: 141 MMOL/L (ref 135–144)
TRIGL SERPL-MCNC: 70 MG/DL
WBC # BLD: 11.2 K/UL (ref 3.5–11.3)

## 2022-09-12 PROCEDURE — 84460 ALANINE AMINO (ALT) (SGPT): CPT

## 2022-09-12 PROCEDURE — 80048 BASIC METABOLIC PNL TOTAL CA: CPT

## 2022-09-12 PROCEDURE — 99213 OFFICE O/P EST LOW 20 MIN: CPT | Performed by: NURSE PRACTITIONER

## 2022-09-12 PROCEDURE — 84450 TRANSFERASE (AST) (SGOT): CPT

## 2022-09-12 PROCEDURE — 83036 HEMOGLOBIN GLYCOSYLATED A1C: CPT

## 2022-09-12 PROCEDURE — 85027 COMPLETE CBC AUTOMATED: CPT

## 2022-09-12 PROCEDURE — 99395 PREV VISIT EST AGE 18-39: CPT | Performed by: NURSE PRACTITIONER

## 2022-09-12 PROCEDURE — 80061 LIPID PANEL: CPT

## 2022-09-12 ASSESSMENT — ENCOUNTER SYMPTOMS
COUGH: 0
WHEEZING: 0
CONSTIPATION: 0
SHORTNESS OF BREATH: 0
SINUS PAIN: 0
DIARRHEA: 0
ABDOMINAL PAIN: 0
RHINORRHEA: 0
SINUS PRESSURE: 0
SORE THROAT: 0
BACK PAIN: 1

## 2022-09-12 NOTE — PATIENT INSTRUCTIONS
SURVEY:    You may be receiving a survey from [employer] regarding your visit today. Please complete the survey to enable us to provide the highest quality of care to you and your family. If you cannot score us a very good on any question, please call the office to discuss how we could of made your experience a very good one. Thank you.

## 2022-09-12 NOTE — PROGRESS NOTES
Name: Magaly Coughlin  : 1990         Chief Complaint:     Chief Complaint   Patient presents with    Pain     Patient states his DDD is doing ok, he did complete PT. States its manageable at the moment. Annual Exam     Patient comes in for wellness, denies any concerns. Varicose Veins     Left lower leg, becoming more prominent. States pain is getting worse. History of Present Illness:      Magaly Coughlin is a 33 y.o.  male who presents with Pain (Patient states his DDD is doing ok, he did complete PT. States its manageable at the moment. ), Annual Exam (Patient comes in for wellness, denies any concerns. ), and Varicose Veins (Left lower leg, becoming more prominent. States pain is getting worse. )      HPI    Wellness: The patient presents for wellness exam. He admits well balanced diet. For exercise he notes \"a lot of lifting\" at work. He denies routine eye exams. He denies routine dental exams. He is vaccinated for covid. Most recent tetanus vaccine unknown, but within 10 years. Current smoking history includes 1/2 PPD. He denies family history of colorectal cancer or prostate cancer. Lumbar DDD:  Upon review of requested records, patient had x-ray of the lumbar spine 3/2015 which showed mild narrowing of disc space L5-S1 which may imply degenerative disc disease. MRI lumbar spine completed 3/2015 showed no focal abnormalities. He completed physical therapy with benefit. Back pain is rated 2-3 on a scale of 10 currently. Pain is worse with activity. He has used epsom salt baths. Varicose Veins:  Following with Dr. Hairston Neither [facility] vascular). Underwent foam sclerotherapy 2022. He admits enlargement in his left leg veins and pain in his left leg. He admits onset of pain that returned gradually after sclerotherapy. He has not been wearing compression stockings routinely. Denies swelling to his left leg. Past Medical History:     No past medical history on file.    Reviewed all health maintenance requirements and ordered appropriate tests  Health Maintenance Due   Topic Date Due    Pneumococcal 0-64 years Vaccine (1 - PCV) Never done    Flu vaccine (1) Never done       Past Surgical History:     Past Surgical History:   Procedure Laterality Date    DENTAL SURGERY      VARICOSE VEIN SURGERY Left 2014        Medications:       Prior to Admission medications    Not on File        Allergies:       Penicillins    Social History:     Tobacco:    reports that he has been smoking cigarettes. He has been smoking an average of .5 packs per day. He has never used smokeless tobacco.  Alcohol:      reports no history of alcohol use. Drug Use:  reports current drug use. Drug: Marijuana Gi Lux). Family History:     Family History   Problem Relation Age of Onset    Cancer Mother     High Blood Pressure Father     Stroke Father        Review of Systems:     Positive and Negative as described in HPI    Review of Systems   Constitutional:  Negative for chills, fatigue, fever and unexpected weight change. HENT:  Negative for congestion, rhinorrhea, sinus pressure, sinus pain and sore throat. Eyes:  Negative for visual disturbance. Respiratory:  Negative for cough, shortness of breath and wheezing. Cardiovascular:  Negative for chest pain and palpitations. Gastrointestinal:  Negative for abdominal pain, constipation and diarrhea. Genitourinary:  Negative for difficulty urinating. Musculoskeletal:  Positive for arthralgias and back pain. Skin:  Negative for rash. Neurological:  Negative for dizziness, light-headedness and headaches. Physical Exam:   Vitals:  /70   Pulse 69   Temp 97.3 °F (36.3 °C)   Resp 14   Ht 5' 6\" (1.676 m)   Wt 147 lb (66.7 kg)   SpO2 99%   BMI 23.73 kg/m²     Physical Exam  Constitutional:       General: He is not in acute distress. Appearance: Normal appearance. He is not ill-appearing or toxic-appearing. HENT:      Head: Normocephalic. Right Ear: External ear normal. There is impacted cerumen. Left Ear: Tympanic membrane, ear canal and external ear normal.      Nose: Nose normal. No congestion or rhinorrhea. Mouth/Throat:      Mouth: Mucous membranes are moist.      Pharynx: No oropharyngeal exudate or posterior oropharyngeal erythema. Cardiovascular:      Rate and Rhythm: Normal rate and regular rhythm. Heart sounds: Normal heart sounds. No murmur heard. Pulmonary:      Effort: Pulmonary effort is normal. No respiratory distress. Breath sounds: Normal breath sounds. No stridor. No wheezing, rhonchi or rales. Abdominal:      General: Abdomen is flat. Bowel sounds are normal. There is no distension. Palpations: Abdomen is soft. There is no mass. Tenderness: There is no abdominal tenderness. There is no guarding. Hernia: No hernia is present. Musculoskeletal:      Cervical back: Neck supple. Lymphadenopathy:      Cervical: No cervical adenopathy. Skin:     General: Skin is warm. Comments: Tortuous, enlarged, tender veins noted left posterior calf and anterior patella/thigh   Neurological:      Mental Status: He is alert and oriented to person, place, and time. Psychiatric:         Mood and Affect: Mood normal.         Behavior: Behavior normal.         Thought Content:  Thought content normal.         Judgment: Judgment normal.       Data:     Lab Results   Component Value Date/Time     09/12/2022 09:15 AM    K 4.1 09/12/2022 09:15 AM     09/12/2022 09:15 AM    CO2 27 09/12/2022 09:15 AM    BUN 13 09/12/2022 09:15 AM    CREATININE 1.03 09/12/2022 09:15 AM    GLUCOSE 99 09/12/2022 09:15 AM    AST 17 09/12/2022 09:15 AM    ALT 21 09/12/2022 09:15 AM     Lab Results   Component Value Date/Time    WBC 11.2 09/12/2022 09:15 AM    RBC 4.62 09/12/2022 09:15 AM    HGB 14.7 09/12/2022 09:15 AM    HCT 44.4 09/12/2022 09:15 AM    MCV 96.1 09/12/2022 09:15 AM    MCH 31.8 09/12/2022 09:15 AM    MCHC 33.1 09/12/2022 09:15 AM    RDW 12.2 09/12/2022 09:15 AM     09/12/2022 09:15 AM    MPV 12.0 09/12/2022 09:15 AM     No results found for: TSH  No results found for: CHOL, LDL, HDL, PSA, LABA1C    Assessment/Plan:      Diagnosis Orders   1. Wellness examination  ALT    AST    Basic Metabolic Panel    CBC    Lipid Panel    Hemoglobin A1C      2. Elevated glucose  Basic Metabolic Panel    Hemoglobin A1C      3. Lipid screening  Lipid Panel      4. DDD (degenerative disc disease), lumbar        5. Varicose veins of left lower extremity with pain            Lumbar DDD:  - Evidence of DDD found on lumbar MRI in 2015  - Patient had benefit with physical therapy  - Patient agreeable to continue conservative treatments such as yoga, PT exercises, ice, heat, OTC pain management  - I also offered referral to pain management. He will consider this and notify office if he is agreeable to this in the future. We may also consider repeating his lumbar MRI as well    Right cerumen impaction:  - Follow-up 1 to 2 weeks for right ear irrigation. Prep information supplied. Varicose veins:  - Follow-up with Dr. Roseanne Kasper. Will forward this note to Dr. Roseanne Kasper for his review   - Patient encouraged to call office for follow-up    Anxiety:  - Patient noted concern with anxiety at the very end of visit. This was not discussed today. First available appointment offered to discuss further. Wellness:  - Counseled on well-balanced diet and routine physical activity  - Encouraged influenza vaccine this fall/early winter  - Offered pneumococcal 23 vaccination as he is a smoker. Patient not interested at this time. VIS information supplied to patient. He will call office to schedule if interested. - Encouraged COVID booster.  - He believes he is UTD tetanus vaccine  - Wellness labs ordered today for completion  - Counseled on smoking cessation. Offered resources such as patches and/or oral medications.   He states he is not interested in resources at this time. Completed Refills   Requested Prescriptions      No prescriptions requested or ordered in this encounter       Orders Placed This Encounter   Procedures    ALT     Standing Status:   Future     Number of Occurrences:   1     Standing Expiration Date:   9/12/2023    AST     Standing Status:   Future     Number of Occurrences:   1     Standing Expiration Date:   4/23/7587    Basic Metabolic Panel     Standing Status:   Future     Number of Occurrences:   1     Standing Expiration Date:   9/12/2023    CBC     Standing Status:   Future     Number of Occurrences:   1     Standing Expiration Date:   9/12/2023    Lipid Panel     Standing Status:   Future     Number of Occurrences:   1     Standing Expiration Date:   9/12/2023    Hemoglobin A1C     Standing Status:   Future     Number of Occurrences:   1     Standing Expiration Date:   9/12/2023          No results found for this visit on 09/12/22. Return in about 6 months (around 3/12/2023), or if symptoms worsen or fail to improve, for DDD + varicose veins f/u .     Electronically signed by AVNI Mane CNP on 09/12/22 at 10:32 AM.

## 2022-09-15 ENCOUNTER — TELEPHONE (OUTPATIENT)
Dept: VASCULAR SURGERY | Age: 32
End: 2022-09-15

## 2022-09-15 NOTE — TELEPHONE ENCOUNTER
Machelle Alberto per Dr. Roseanne Kasper request for a sooner appointment. Left message . Appointment 09/21 at 2:45. Asked patient to call back and confirm.

## 2022-09-27 ENCOUNTER — OFFICE VISIT (OUTPATIENT)
Dept: PRIMARY CARE CLINIC | Age: 32
End: 2022-09-27
Payer: COMMERCIAL

## 2022-09-27 VITALS
DIASTOLIC BLOOD PRESSURE: 76 MMHG | RESPIRATION RATE: 18 BRPM | OXYGEN SATURATION: 97 % | WEIGHT: 145.6 LBS | HEIGHT: 66 IN | SYSTOLIC BLOOD PRESSURE: 122 MMHG | TEMPERATURE: 98.5 F | HEART RATE: 67 BPM | BODY MASS INDEX: 23.4 KG/M2

## 2022-09-27 DIAGNOSIS — F32.A DEPRESSION, UNSPECIFIED DEPRESSION TYPE: ICD-10-CM

## 2022-09-27 DIAGNOSIS — H61.21 RIGHT EAR IMPACTED CERUMEN: Primary | ICD-10-CM

## 2022-09-27 PROCEDURE — 69209 REMOVE IMPACTED EAR WAX UNI: CPT | Performed by: NURSE PRACTITIONER

## 2022-09-27 PROCEDURE — 99213 OFFICE O/P EST LOW 20 MIN: CPT | Performed by: NURSE PRACTITIONER

## 2022-09-27 ASSESSMENT — PATIENT HEALTH QUESTIONNAIRE - PHQ9
SUM OF ALL RESPONSES TO PHQ QUESTIONS 1-9: 18
4. FEELING TIRED OR HAVING LITTLE ENERGY: 3
8. MOVING OR SPEAKING SO SLOWLY THAT OTHER PEOPLE COULD HAVE NOTICED. OR THE OPPOSITE, BEING SO FIGETY OR RESTLESS THAT YOU HAVE BEEN MOVING AROUND A LOT MORE THAN USUAL: 0
SUM OF ALL RESPONSES TO PHQ9 QUESTIONS 1 & 2: 5
SUM OF ALL RESPONSES TO PHQ QUESTIONS 1-9: 18
1. LITTLE INTEREST OR PLEASURE IN DOING THINGS: 3
5. POOR APPETITE OR OVEREATING: 3
10. IF YOU CHECKED OFF ANY PROBLEMS, HOW DIFFICULT HAVE THESE PROBLEMS MADE IT FOR YOU TO DO YOUR WORK, TAKE CARE OF THINGS AT HOME, OR GET ALONG WITH OTHER PEOPLE: 1
2. FEELING DOWN, DEPRESSED OR HOPELESS: 2
6. FEELING BAD ABOUT YOURSELF - OR THAT YOU ARE A FAILURE OR HAVE LET YOURSELF OR YOUR FAMILY DOWN: 2
7. TROUBLE CONCENTRATING ON THINGS, SUCH AS READING THE NEWSPAPER OR WATCHING TELEVISION: 2
3. TROUBLE FALLING OR STAYING ASLEEP: 3
9. THOUGHTS THAT YOU WOULD BE BETTER OFF DEAD, OR OF HURTING YOURSELF: 0

## 2022-09-27 NOTE — PROGRESS NOTES
Name: Susan Urbano  : 1990         Chief Complaint:     Chief Complaint   Patient presents with    Anxiety     Patient states some days are better than others. His anxiety is hit or miss    Ear Fullness     Right ear fullness. History of Present Illness:      Susan Urbano is a 33 y.o.  male who presents with Anxiety (Patient states some days are better than others. His anxiety is hit or miss) and Ear Fullness (Right ear fullness.)      HPI    Anxiety:  The patient admits history of PTSD, anxiety, and depression that he was diagnosed with while in the [facility]. 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 He deployed several times, once into the [place]. He states he will set goals for himself and then \"psych himself out\". He states he has racing thoughts and feeling like his \"mind has been going a million miles a minute\". Admits worrying. He admits troubles staying asleep. He is not taking anything for sleep. He is averaging 3-5 hours of sleep nightly. Denies thoughts of hurting self or others. He does feel like he is letting others down. He states he frequently stands with his back against the wall because he is nervous of people standing behind him ( of [facility]). He admits friend who passed away from overdose in the patient's arms. He admits looking down at his hands and this brings back memories. He denies known triggers. He was previously on a medication but this made him feel \"drugged up\", he is unsure the name. He has been in counseling in the past who taught him deep breathing techniques. He has not been in counseling since he was in the service. Right Ear Irrigation:   Completed by Jessy Tate CMA and VANDANA Souza    Past Medical History:     No past medical history on file. Reviewed all health maintenance requirements and ordered appropriate tests  There are no preventive care reminders to display for this patient.     Past Surgical History:     Past Surgical History:   Procedure Laterality Date    DENTAL SURGERY      VARICOSE VEIN SURGERY Left 2014        Medications:       Prior to Admission medications    Medication Sig Start Date End Date Taking? Authorizing Provider   sertraline (ZOLOFT) 50 MG tablet Take 1 tablet by mouth daily 9/27/22  Yes AVNI Rios CNP        Allergies:       Penicillins    Social History:     Tobacco:    reports that he has been smoking cigarettes. He has been smoking an average of .5 packs per day. He has never used smokeless tobacco.  Alcohol:      reports no history of alcohol use. Drug Use:  reports current drug use. Drug: Marijuana Rao Semen). Family History:     Family History   Problem Relation Age of Onset    Cancer Mother     High Blood Pressure Father     Stroke Father        Review of Systems:     Positive and Negative as described in HPI    Review of Systems   Psychiatric/Behavioral:  Positive for dysphoric mood and sleep disturbance. Negative for suicidal ideas. The patient is nervous/anxious. Physical Exam:   Vitals:  /76 (Site: Right Upper Arm)   Pulse 67   Temp 98.5 °F (36.9 °C)   Resp 18   Ht 5' 6\" (1.676 m)   Wt 145 lb 9.6 oz (66 kg)   SpO2 97%   BMI 23.50 kg/m²     Physical Exam  Constitutional:       General: He is not in acute distress. Appearance: Normal appearance. He is normal weight. He is not ill-appearing or toxic-appearing. Cardiovascular:      Rate and Rhythm: Normal rate and regular rhythm. Heart sounds: Normal heart sounds. No murmur heard. Pulmonary:      Effort: Pulmonary effort is normal. No respiratory distress. Breath sounds: Normal breath sounds. No stridor. No wheezing, rhonchi or rales. Neurological:      Mental Status: He is alert and oriented to person, place, and time. Psychiatric:         Mood and Affect: Mood normal.         Behavior: Behavior normal.         Thought Content:  Thought content normal. Judgment: Judgment normal.       Data:     Lab Results   Component Value Date/Time     09/12/2022 09:15 AM    K 4.1 09/12/2022 09:15 AM     09/12/2022 09:15 AM    CO2 27 09/12/2022 09:15 AM    BUN 13 09/12/2022 09:15 AM    CREATININE 1.03 09/12/2022 09:15 AM    GLUCOSE 99 09/12/2022 09:15 AM    AST 17 09/12/2022 09:15 AM    ALT 21 09/12/2022 09:15 AM     Lab Results   Component Value Date/Time    WBC 11.2 09/12/2022 09:15 AM    RBC 4.62 09/12/2022 09:15 AM    HGB 14.7 09/12/2022 09:15 AM    HCT 44.4 09/12/2022 09:15 AM    MCV 96.1 09/12/2022 09:15 AM    MCH 31.8 09/12/2022 09:15 AM    MCHC 33.1 09/12/2022 09:15 AM    RDW 12.2 09/12/2022 09:15 AM     09/12/2022 09:15 AM    MPV 12.0 09/12/2022 09:15 AM     No results found for: TSH  Lab Results   Component Value Date/Time    CHOL 163 09/12/2022 09:16 AM    HDL 45 09/12/2022 09:16 AM    LABA1C 5.7 09/12/2022 09:15 AM       Assessment/Plan:      Diagnosis Orders   1. Right ear impacted cerumen  AR REMOVAL IMPACTED CERUMEN IRRIGATION/LVG UNILAT      2. Depression, unspecified depression type            Right Cerumen Impaction:   -- Right ear irrigation completed successfully by VANDANA Blackburn and Bennett Prakash CMA    Anxiety, Depression, PTSD:   - Will initiate sertraline 50mg QD  -- Encouraged healthy diet, routine exercise, remaining well-hydrated, 8 hours of sleep nightly, yoga, meditation, and deep breathing exercises  - Reviewed side effects of this medication at length. - Discussed that this medication may take 4 to 6 weeks to see full benefit.   - Discussed that thoughts of hurting self or others is a medical emergency and if experiencing these thoughts, patient should present to ED or call 911.  - Patient is not interest in counseling  -- F/u 3 weeks for zoloft med check     Completed Refills   Requested Prescriptions     Signed Prescriptions Disp Refills    sertraline (ZOLOFT) 50 MG tablet 30 tablet 3     Sig: Take 1 tablet by mouth daily       Orders Placed This Encounter   Procedures    ND REMOVAL IMPACTED CERUMEN IRRIGATION/LVG UNILAT        No results found for this visit on 09/27/22. Return in about 3 weeks (around 10/18/2022), or if symptoms worsen or fail to improve, for sertraline med check .     Electronically signed by AVNI Ross CNP on 09/27/22 at 1:38 PM.

## 2022-10-03 DIAGNOSIS — I83.819 VARICOSE VEINS WITH PAIN: Primary | ICD-10-CM

## 2022-10-24 ENCOUNTER — OFFICE VISIT (OUTPATIENT)
Dept: PRIMARY CARE CLINIC | Age: 32
End: 2022-10-24
Payer: COMMERCIAL

## 2022-10-24 ENCOUNTER — HOSPITAL ENCOUNTER (OUTPATIENT)
Dept: VASCULAR LAB | Age: 32
Discharge: HOME | End: 2022-10-26
Payer: COMMERCIAL

## 2022-10-24 VITALS
SYSTOLIC BLOOD PRESSURE: 102 MMHG | DIASTOLIC BLOOD PRESSURE: 78 MMHG | WEIGHT: 143 LBS | HEART RATE: 67 BPM | TEMPERATURE: 96.8 F | BODY MASS INDEX: 23.08 KG/M2 | OXYGEN SATURATION: 99 %

## 2022-10-24 DIAGNOSIS — F43.10 PTSD (POST-TRAUMATIC STRESS DISORDER): ICD-10-CM

## 2022-10-24 DIAGNOSIS — F41.9 ANXIETY: ICD-10-CM

## 2022-10-24 DIAGNOSIS — I83.819 VARICOSE VEINS WITH PAIN: ICD-10-CM

## 2022-10-24 DIAGNOSIS — F32.A DEPRESSION, UNSPECIFIED DEPRESSION TYPE: Primary | ICD-10-CM

## 2022-10-24 PROCEDURE — 93971 EXTREMITY STUDY: CPT

## 2022-10-24 PROCEDURE — 99213 OFFICE O/P EST LOW 20 MIN: CPT | Performed by: NURSE PRACTITIONER

## 2022-10-24 NOTE — PROGRESS NOTES
Name: Freddie Owens  : 1990         Chief Complaint:     Chief Complaint   Patient presents with    Follow-up     - 3 week med check, sertraline 50 mg daily  - its helping him sleep better but, he is taking it at 7 pm sleep by 9 pm and up by 2 am  - would like to increase the dose to see if that would help to sleep through the night. - denies any side effects           History of Present Illness:      Freddie Owens is a 33 y.o.  male who presents with Follow-up (- 3 week med check, sertraline 50 mg daily/- its helping him sleep better but, he is taking it at 7 pm sleep by 9 pm and up by 2 am/- would like to increase the dose to see if that would help to sleep through the night./- denies any side effects//)      HPI    HPI 10/24/22: The patient presents for anxiety, depression, PTSD follow-up. He was evaluated in office 2022 and initiated on sertraline 50 mg daily. On this date he states that he was not interested in counseling. Today, he states that this medication \"is working\". He denies nightmares. He states \"anxiety and depression are low\". He is taking this medication before bed. This medication helps him to fall asleep. However, he has been waking up around 2am and unable to fall asleep. Denies side effects. He states he \"feels good\". HPI 22: The patient admits history of PTSD, anxiety, and depression that he was diagnosed with while in the [facility]. 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 He deployed several times, once into the [place]. He states he will set goals for himself and then \"psych himself out\". He states he has racing thoughts and feeling like his \"mind has been going a million miles a minute\". Admits worrying. He admits troubles staying asleep. He is not taking anything for sleep. He is averaging 3-5 hours of sleep nightly. Denies thoughts of hurting self or others. He does feel like he is letting others down.  He states he frequently stands with his back against the wall because he is nervous of people standing behind him (hx of [employer]). He admits friend who passed away from overdose in the patient's arms. He admits looking down at his hands and this brings back memories. He denies known triggers. He was previously on a medication but this made him feel \"drugged up\", he is unsure the name. He has been in counseling in the past who taught him deep breathing techniques. He has not been in counseling since he was in the service. Past Medical History:     No past medical history on file. Reviewed all health maintenance requirements and ordered appropriate tests  There are no preventive care reminders to display for this patient. Past Surgical History:     Past Surgical History:   Procedure Laterality Date    DENTAL SURGERY      VARICOSE VEIN SURGERY Left 2014        Medications:       Prior to Admission medications    Medication Sig Start Date End Date Taking? Authorizing Provider   sertraline (ZOLOFT) 50 MG tablet Take 1 tablet by mouth daily 9/27/22  Yes AVNI Lorenzana CNP        Allergies:       Penicillins    Social History:     Tobacco:    reports that he has been smoking cigarettes. He has been smoking an average of .5 packs per day. He has never used smokeless tobacco.  Alcohol:      reports no history of alcohol use. Drug Use:  reports current drug use. Drug: Marijuana Marcus Larsen). Family History:     Family History   Problem Relation Age of Onset    Cancer Mother     High Blood Pressure Father     Stroke Father        Review of Systems:     Positive and Negative as described in HPI    Review of Systems   Psychiatric/Behavioral:  Positive for sleep disturbance. Negative for dysphoric mood. The patient is not nervous/anxious.       Physical Exam:   Vitals:  /78   Pulse 67   Temp 96.8 °F (36 °C)   Wt 143 lb (64.9 kg)   SpO2 99%   BMI 23.08 kg/m²     Physical Exam  Constitutional:       General: He is not in acute distress. Appearance: Normal appearance. He is normal weight. He is not ill-appearing or toxic-appearing. HENT:      Head: Normocephalic. Cardiovascular:      Rate and Rhythm: Normal rate and regular rhythm. Heart sounds: Normal heart sounds. No murmur heard. Pulmonary:      Effort: Pulmonary effort is normal. No respiratory distress. Breath sounds: Normal breath sounds. No stridor. No wheezing, rhonchi or rales. Skin:     General: Skin is warm. Neurological:      Mental Status: He is alert and oriented to person, place, and time. Psychiatric:         Mood and Affect: Mood normal.         Behavior: Behavior normal.         Thought Content: Thought content normal.         Judgment: Judgment normal.       Data:     Lab Results   Component Value Date/Time     09/12/2022 09:15 AM    K 4.1 09/12/2022 09:15 AM     09/12/2022 09:15 AM    CO2 27 09/12/2022 09:15 AM    BUN 13 09/12/2022 09:15 AM    CREATININE 1.03 09/12/2022 09:15 AM    GLUCOSE 99 09/12/2022 09:15 AM    AST 17 09/12/2022 09:15 AM    ALT 21 09/12/2022 09:15 AM     Lab Results   Component Value Date/Time    WBC 11.2 09/12/2022 09:15 AM    RBC 4.62 09/12/2022 09:15 AM    HGB 14.7 09/12/2022 09:15 AM    HCT 44.4 09/12/2022 09:15 AM    MCV 96.1 09/12/2022 09:15 AM    MCH 31.8 09/12/2022 09:15 AM    MCHC 33.1 09/12/2022 09:15 AM    RDW 12.2 09/12/2022 09:15 AM     09/12/2022 09:15 AM    MPV 12.0 09/12/2022 09:15 AM     No results found for: TSH  Lab Results   Component Value Date/Time    CHOL 163 09/12/2022 09:16 AM    HDL 45 09/12/2022 09:16 AM    LABA1C 5.7 09/12/2022 09:15 AM       Assessment/Plan:      Diagnosis Orders   1. Depression, unspecified depression type        2. Anxiety        3. PTSD (post-traumatic stress disorder)            Anxiety, Depression, PTSD:   - Discussed options such as continuing Zoloft 50 mg daily or increasing to Zoloft 100 mg daily.   Patient would like to remain on 50 mg daily.  - He will call office in several weeks with status update or if he feels that he needs to increase his Zoloft to 100 mg daily.  - Patient declines counseling  - Trial low-dose melatonin 5 mg nightly to assist with nighttime awakening. If this does not improve symptoms, notify office  - Follow-up 3/2023 or sooner with any concerns    Completed Refills   Requested Prescriptions      No prescriptions requested or ordered in this encounter       No orders of the defined types were placed in this encounter. No results found for this visit on 10/24/22. Return if symptoms worsen or fail to improve.     Electronically signed by AVNI Newman CNP on 10/24/22 at 9:09 AM.

## 2022-10-24 NOTE — PATIENT INSTRUCTIONS
SURVEY:    You may be receiving a survey from [employer] regarding your visit today. Please complete the survey to enable us to provide the highest quality of care to you and your family. If you cannot score us a very good on any question, please call the office to discuss how we could of made your experience a very good one. Thank you.

## 2022-10-25 DIAGNOSIS — I83.813 VARICOSE VEINS OF BOTH LOWER EXTREMITIES WITH PAIN: Primary | ICD-10-CM

## 2022-12-13 ENCOUNTER — HOSPITAL ENCOUNTER (OUTPATIENT)
Dept: INTERVENTIONAL RADIOLOGY/VASCULAR | Age: 32
Discharge: HOME | End: 2022-12-15
Payer: COMMERCIAL

## 2022-12-13 ENCOUNTER — HOSPITAL ENCOUNTER (OUTPATIENT)
Dept: VASCULAR LAB | Age: 32
Discharge: HOME | End: 2022-12-15
Payer: COMMERCIAL

## 2022-12-13 VITALS
DIASTOLIC BLOOD PRESSURE: 77 MMHG | BODY MASS INDEX: 22.18 KG/M2 | TEMPERATURE: 98.1 F | HEIGHT: 66 IN | RESPIRATION RATE: 22 BRPM | SYSTOLIC BLOOD PRESSURE: 121 MMHG | OXYGEN SATURATION: 97 % | WEIGHT: 138 LBS | HEART RATE: 64 BPM

## 2022-12-13 DIAGNOSIS — M79.662 PAIN OF LEFT LOWER LEG: Primary | ICD-10-CM

## 2022-12-13 DIAGNOSIS — I83.819 VARICOSE VEINS WITH PAIN: ICD-10-CM

## 2022-12-13 DIAGNOSIS — I83.813 VARICOSE VEINS OF BOTH LOWER EXTREMITIES WITH PAIN: ICD-10-CM

## 2022-12-13 PROCEDURE — C1894 INTRO/SHEATH, NON-LASER: HCPCS

## 2022-12-13 PROCEDURE — 2500000003 HC RX 250 WO HCPCS

## 2022-12-13 PROCEDURE — 36475 ENDOVENOUS RF 1ST VEIN: CPT

## 2022-12-13 PROCEDURE — 2500000003 HC RX 250 WO HCPCS: Performed by: INTERNAL MEDICINE

## 2022-12-13 PROCEDURE — 2580000003 HC RX 258: Performed by: INTERNAL MEDICINE

## 2022-12-13 PROCEDURE — 76942 ECHO GUIDE FOR BIOPSY: CPT

## 2022-12-13 PROCEDURE — 36470 NJX SCLRSNT 1 INCMPTNT VEIN: CPT

## 2022-12-13 RX ORDER — MELATONIN 10 MG
10 CAPSULE ORAL NIGHTLY
COMMUNITY

## 2022-12-13 RX ORDER — LIDOCAINE HYDROCHLORIDE 10 MG/ML
INJECTION, SOLUTION EPIDURAL; INFILTRATION; INTRACAUDAL; PERINEURAL
Status: COMPLETED | OUTPATIENT
Start: 2022-12-13 | End: 2022-12-13

## 2022-12-13 RX ORDER — SODIUM TETRADECYL SULFATE 30 MG/ML
INJECTION, SOLUTION INTRAVENOUS
Status: COMPLETED | OUTPATIENT
Start: 2022-12-13 | End: 2022-12-13

## 2022-12-13 RX ADMIN — LIDOCAINE HYDROCHLORIDE 5 ML: 10 INJECTION, SOLUTION EPIDURAL; INFILTRATION; INTRACAUDAL; PERINEURAL at 10:05

## 2022-12-13 RX ADMIN — SODIUM BICARBONATE: 84 INJECTION, SOLUTION INTRAVENOUS at 10:08

## 2022-12-13 RX ADMIN — TETRADECYL HYDROGEN SULFATE (ESTER) 2 ML: 30 INJECTION, SOLUTION INTRAVENOUS at 11:01

## 2022-12-13 NOTE — PROCEDURES
[address], [place] [address]                            CARDIAC CATHETERIZATION    PATIENT NAME: Nicole Maher                       :        1990  MED REC NO:   [id number]                              ROOM:  ACCOUNT NO:   [de-identified]                           ADMIT DATE: 2022  PROVIDER:     Nataliia Amaro    DATE OF PROCEDURE:  2022    FINAL IMPRESSION:  Successful radiofrequency ablation of the left small  saphenous vein and successful foam sclerotherapy of the left anterior  accessory saphenous vein. ESTIMATED BLOOD LOSS:  5 mL. PROCEDURE:  Ultrasound-guided left small saphenous vein radiofrequency  ablation using tumescent anesthesia and left anterior accessory  saphenous vein foam sclerotherapy. METHOD:  Written informed consent was obtained. The patient was placed  in prone position and the left small saphenous vein was identified under  ultrasound. Using micropuncture technique, a 7-Mauritanian sheath was  introduced. The radiofrequency probe was advanced until its tip was 2.5  cm from the saphenopopliteal junction. Tumescent anesthesia was  administered with confirmation of an appropriate decrease in tip  temperature. A double treatment was performed at the initial location  followed by single treatment according to the IFU. The patient was then placed in supine position and the left anterior  accessory saphenous vein was identified. Using micropuncture technique,  a micropuncture inner cannula was introduced into this vessel. Its  location was confirmed using ultrasound. The saphenofemoral junction  was compressed and 2 mL of 3% Sotradecol prepared using the Tessari  method was injected into the vein and massaged distally to the  symptomatic varicose veins in the thigh and knee. Continued compression  was applied at the saphenopopliteal junction. The introducer sheath was  withdrawn.   Adequate hemostasis was achieved. The patient was placed in  a compression stocking and instructed to walk at least 30 minutes daily. There were no complications. COMMENT:  The patient is a 27-year-old gentleman with disabling symptoms  from reflux in the left small saphenous vein and left anterior accessory  saphenous vein. He will be managed with compression and followup  ultrasound.         Sarah Figueroa    D: 12/13/2022 11:29:51       T: 12/13/2022 11:35:17     [email or web address]  Job#: [id number]     Doc#: [id number]    CC:

## 2022-12-13 NOTE — DISCHARGE INSTRUCTIONS
Radiofrequency Sclerotherapy: After Your Procedure  What is sclerotherapy? Sclerotherapy is a treatment to get rid of varicose veins. A chemical is injected along the vessel to numb the area and then radiofrequency treatment is completed. This causes the vein to close. The procedure can take up to 30 minutes. You should be able to walk on your own after the treatment. Follow-up care is a key part of your treatment and safety. Be sure to make and go to all appointments, and call your doctor if you are having problems. It's also a good idea to know your test results and keep a list of the medicines you take. Going home  Be sure you have someone to drive you home. You will be able to return to normal activity as tolerated. Walking is encouraged at regular intervals throughout the day for at least a total of 30 minutes per day. Avoid long periods of sitting or standing. Refrain from strenuous activities or heavy lifting for several days. Resume a regular diet      Wear compression stocking for 1-2 weeks. If necessary you may shower leaving compression hose on and drying thoroughly with hair dryer. If change of stocking is needed, remove soiled stocking and apply clean stocking immediately. Take analgesics as needed for pain. Watch for signs of infection such as:    redness, swelling, drainage and fever. Please notify doctor of these. Follow up vascular studies in 1-2 weeks. When should you call your doctor? You have questions or concerns. Sclerotherapy: After Your Procedure  What is sclerotherapy? Sclerotherapy is a treatment to get rid of varicose veins. A chemical is injected into the varicose vein. This causes the vein to close. The procedure may cause some pain. The doctor will inject a local anesthetic. The chemical may cause burning or cramping for a few minutes at the injection site. The procedure can take up to 30 minutes.  It depends on how many veins are treated and how big they are. You should be able to walk on your own after the treatment. Follow-up care is a key part of your treatment and safety. Be sure to make and go to all appointments, and call your doctor if you are having problems. It's also a good idea to know your test results and keep a list of the medicines you take. Going home  Be sure you have someone to drive you home. You will be given more specific instructions about recovering from your procedure. They will cover things like diet, wound care, follow-up care, driving, and getting back to your normal routine. Resume a regular diet      Wear compression stocking for 1-2 weeks. If necessary you may shower leaving compression hose on and drying thoroughly with hair dryer. If change of stocking is needed, remove soiled stocking and apply clean stocking immediately. (Some chemicals do not require use of stocking. Staff will advise at time of discharge.)  Watch for signs of infection such as:    redness, swelling, drainage and fever. Please notify doctor of these. Follow up vascular studies in 1-2 weeks. When should you call your doctor? You have questions or concerns. Where can you learn more? Go to [email or web address] and sign in to your [employer] account. Enter C494 in the [employer] box to learn more about Sclerotherapy: Before Your Procedure.     If you do not have an account, please click on the Sign Up Now link. © 3210-4564 Healthwise, Incorporated. Care instructions adapted under license by [facility] ([facility]). This care instruction is for use with your licensed healthcare professional. If you have questions about a medical condition or this instruction, always ask your healthcare professional. [address] any warranty or liability for your use of this information.   Content Version: 45.9.843694; Current as of: August 21, 2015

## 2022-12-13 NOTE — PROGRESS NOTES
Discharge instructions reviewed, voices understanding. Bandaids to left lower leg intact. Dressed for home. Ambulates off department unaided, all belongings sent with patient.

## 2022-12-19 ENCOUNTER — HOSPITAL ENCOUNTER (OUTPATIENT)
Dept: VASCULAR LAB | Age: 32
Discharge: HOME | End: 2022-12-21
Payer: COMMERCIAL

## 2022-12-19 DIAGNOSIS — M79.662 PAIN OF LEFT LOWER LEG: ICD-10-CM

## 2022-12-19 DIAGNOSIS — I83.813 VARICOSE VEINS OF BOTH LOWER EXTREMITIES WITH PAIN: ICD-10-CM

## 2022-12-19 PROCEDURE — 93971 EXTREMITY STUDY: CPT

## 2022-12-21 ENCOUNTER — OFFICE VISIT (OUTPATIENT)
Dept: VASCULAR SURGERY | Age: 32
End: 2022-12-21
Payer: COMMERCIAL

## 2022-12-21 VITALS
RESPIRATION RATE: 18 BRPM | DIASTOLIC BLOOD PRESSURE: 60 MMHG | WEIGHT: 148.1 LBS | SYSTOLIC BLOOD PRESSURE: 133 MMHG | HEART RATE: 69 BPM | HEIGHT: 66 IN | BODY MASS INDEX: 23.8 KG/M2 | TEMPERATURE: 97.3 F

## 2022-12-21 DIAGNOSIS — I83.813 VARICOSE VEINS OF BOTH LOWER EXTREMITIES WITH PAIN: Primary | ICD-10-CM

## 2022-12-21 PROCEDURE — 99214 OFFICE O/P EST MOD 30 MIN: CPT | Performed by: INTERNAL MEDICINE

## 2022-12-21 NOTE — PROGRESS NOTES
Laura Chan was seen on 12/21/2022 for   Chief Complaint   Patient presents with    Follow-up     Some bruising from procedure   . 3/30/22 1st [facility] visit for VV. Referred by Franck Christine CNP. Has had trouble since 2013. Bulging painful vein. Grandmother had VV. He had stab phlebectomy left LATERAL knee, whereas painful vein is MEDIAL in [place] in 2014. No improvement. No persistent improvement with long term (>>3 mo) compression. Vein feels like it's on fire. Interferes with ADL. Interferes with sleep, work, etc.   [employer]  in [address]. On feet a lot. Duplex today showed reflux at LCFV, FV, and pop. There is also reflux at SFJ, THIGH 1389 5.1 MM, KNEE 1694, SSV 6.5 without reflux. AASV prox 5.7  3533, mid 3.1  4700, distal 2.8  4511. The painful vein described by pt at medial knee connects to aasv and is 3.2 mm with 4772  General health is good. UPDATE 12/21/22 Often seen in [place] to accomodate work schedle. On 12/13/22 had LSSV RF and LAASV STS. Tough to access laasv with needle initially. Bruising after the procedure. (Probably related). Follow up duplex shows vessel closure ssv and aasv prox and mid thigh. Distal aasv was still open. Overall leg feels better.  Prior treatment of Desire Berg was 4/12/22 with foam.       Social History     Socioeconomic History    Marital status:      Spouse name: Not on file    Number of children: Not on file    Years of education: Not on file    Highest education level: Not on file   Occupational History    Not on file   Tobacco Use    Smoking status: Every Day     Packs/day: 0.50     Types: Cigarettes    Smokeless tobacco: Never   Vaping Use    Vaping Use: Never used   Substance and Sexual Activity    Alcohol use: Never    Drug use: Yes     Types: Marijuana José Luis Jo    Sexual activity: Not on file   Other Topics Concern    Not on file   Social History Narrative    Not on file     Social Determinants of Health     Financial Resource Strain: Low Risk Difficulty of Paying Living Expenses: Not hard at all   Food Insecurity: No Food Insecurity    Worried About Running Out of Food in the Last Year: Never true    Ran Out of Food in the Last Year: Never true   Transportation Needs: Not on file   Physical Activity: Not on file   Stress: Not on file   Social Connections: Not on file   Intimate Partner Violence: Not on file   Housing Stability: Not on file     Family History   Problem Relation Age of Onset    Cancer Mother     High Blood Pressure Father     Stroke Father      Past Medical History:   Diagnosis Date    Depression     PTSD (post-traumatic stress disorder)      Current Outpatient Medications   Medication Sig Dispense Refill    melatonin 10 MG CAPS capsule Take 10 mg by mouth nightly      sertraline (ZOLOFT) 50 MG tablet Take 1 tablet by mouth daily 30 tablet 3     No current facility-administered medications for this visit. Physical findings:  General- no acute distress, oriented  HEENT - no xanthelasma, external ears normal  Neck- Supple, no thyromegaly  Skin- warm, dry, no skin breakdown or gangrene. Mild bruising left ant thigh  Extremities - no edema    Assessment:  1. Varicose veins of both lower extremities with pain       Improved post treatment  Still has distal laasv patent, which can be treated with foam if needed. 30 min chart review and pt encounetter  Plan of care:  Rtc 6 mo      Follow up and evaluate.        Electronically signed by Kalpesh Bro MD on 12/21/22 at 1:01 PM EST

## 2022-12-21 NOTE — PROGRESS NOTES
Casey Cooney was seen on 12/21/2022 for   Chief Complaint   Patient presents with    Follow-up     Some bruising from procedure   .                             REVIEW OF SYSTEMS    Constitutional Weight: absent, A, Fatigue: absent Fever: absent   HEENT Ears: normal,Visual disturbance: absent Sore throat: absent,    Respiratory Shortness of breath: absent, Cough: absent;, Snoring: absent   Cardivascular Chest pain: absent,  Leg pain: absent,Leg swelling:absent, Non-healing wound:absent    GI Diarrhea: absent, Abdominal Pain: absent    Urinary frequency: absent, Urinary incontinence: absent   Musculoskeletal Neck pain: absent, Back pain: absent, Restless Leg:absent     Dermatological Hair loss: absent, Skin changes: absent   Neurological  Sudden Loss of Vision in one eye:absent, Slurred Speech:absent, Weakness on one side of body: absent,Headache: absent   Psychiatric Anxiety: present, Depression: present   Hematologic Abnormal bleeding: absent,

## 2022-12-21 NOTE — PATIENT INSTRUCTIONS
SURVEY:    You may be receiving a survey from [employer] regarding your visit today. Please complete the survey to enable us to provide the highest quality of care to you and your family. If you cannot score us a very good on any question, please call the office to discuss how we could have made your experience a very good one. Thank you.

## 2023-02-27 ENCOUNTER — OFFICE VISIT (OUTPATIENT)
Dept: PRIMARY CARE CLINIC | Age: 33
End: 2023-02-27
Payer: COMMERCIAL

## 2023-02-27 VITALS
TEMPERATURE: 96.8 F | DIASTOLIC BLOOD PRESSURE: 80 MMHG | OXYGEN SATURATION: 99 % | HEART RATE: 66 BPM | WEIGHT: 150 LBS | SYSTOLIC BLOOD PRESSURE: 120 MMHG | RESPIRATION RATE: 18 BRPM | HEIGHT: 66 IN | BODY MASS INDEX: 24.11 KG/M2

## 2023-02-27 DIAGNOSIS — I83.93 ASYMPTOMATIC VARICOSE VEINS OF BOTH LOWER EXTREMITIES: ICD-10-CM

## 2023-02-27 DIAGNOSIS — F41.9 ANXIETY: ICD-10-CM

## 2023-02-27 DIAGNOSIS — M51.36 DDD (DEGENERATIVE DISC DISEASE), LUMBAR: ICD-10-CM

## 2023-02-27 DIAGNOSIS — F43.10 PTSD (POST-TRAUMATIC STRESS DISORDER): ICD-10-CM

## 2023-02-27 DIAGNOSIS — F32.A DEPRESSION, UNSPECIFIED DEPRESSION TYPE: Primary | ICD-10-CM

## 2023-02-27 PROCEDURE — 99214 OFFICE O/P EST MOD 30 MIN: CPT | Performed by: NURSE PRACTITIONER

## 2023-02-27 RX ORDER — SERTRALINE HYDROCHLORIDE 100 MG/1
100 TABLET, FILM COATED ORAL DAILY
Qty: 30 TABLET | Refills: 5 | Status: SHIPPED | OUTPATIENT
Start: 2023-02-27

## 2023-02-27 SDOH — ECONOMIC STABILITY: FOOD INSECURITY: WITHIN THE PAST 12 MONTHS, THE FOOD YOU BOUGHT JUST DIDN'T LAST AND YOU DIDN'T HAVE MONEY TO GET MORE.: NEVER TRUE

## 2023-02-27 SDOH — ECONOMIC STABILITY: HOUSING INSECURITY
IN THE LAST 12 MONTHS, WAS THERE A TIME WHEN YOU DID NOT HAVE A STEADY PLACE TO SLEEP OR SLEPT IN A SHELTER (INCLUDING NOW)?: NO

## 2023-02-27 SDOH — ECONOMIC STABILITY: INCOME INSECURITY: HOW HARD IS IT FOR YOU TO PAY FOR THE VERY BASICS LIKE FOOD, HOUSING, MEDICAL CARE, AND HEATING?: NOT HARD AT ALL

## 2023-02-27 SDOH — ECONOMIC STABILITY: FOOD INSECURITY: WITHIN THE PAST 12 MONTHS, YOU WORRIED THAT YOUR FOOD WOULD RUN OUT BEFORE YOU GOT MONEY TO BUY MORE.: NEVER TRUE

## 2023-02-27 ASSESSMENT — PATIENT HEALTH QUESTIONNAIRE - PHQ9
6. FEELING BAD ABOUT YOURSELF - OR THAT YOU ARE A FAILURE OR HAVE LET YOURSELF OR YOUR FAMILY DOWN: 0
SUM OF ALL RESPONSES TO PHQ QUESTIONS 1-9: 0
SUM OF ALL RESPONSES TO PHQ9 QUESTIONS 1 & 2: 0
3. TROUBLE FALLING OR STAYING ASLEEP: 0
8. MOVING OR SPEAKING SO SLOWLY THAT OTHER PEOPLE COULD HAVE NOTICED. OR THE OPPOSITE, BEING SO FIGETY OR RESTLESS THAT YOU HAVE BEEN MOVING AROUND A LOT MORE THAN USUAL: 0
2. FEELING DOWN, DEPRESSED OR HOPELESS: 0
SUM OF ALL RESPONSES TO PHQ QUESTIONS 1-9: 0
1. LITTLE INTEREST OR PLEASURE IN DOING THINGS: 0
SUM OF ALL RESPONSES TO PHQ QUESTIONS 1-9: 0
5. POOR APPETITE OR OVEREATING: 0
7. TROUBLE CONCENTRATING ON THINGS, SUCH AS READING THE NEWSPAPER OR WATCHING TELEVISION: 0
4. FEELING TIRED OR HAVING LITTLE ENERGY: 0
9. THOUGHTS THAT YOU WOULD BE BETTER OFF DEAD, OR OF HURTING YOURSELF: 0
SUM OF ALL RESPONSES TO PHQ QUESTIONS 1-9: 0
10. IF YOU CHECKED OFF ANY PROBLEMS, HOW DIFFICULT HAVE THESE PROBLEMS MADE IT FOR YOU TO DO YOUR WORK, TAKE CARE OF THINGS AT HOME, OR GET ALONG WITH OTHER PEOPLE: 0

## 2023-02-27 NOTE — PATIENT INSTRUCTIONS
SURVEY:    You may be receiving a survey from [employer] regarding your visit today. Please complete the survey to enable us to provide the highest quality of care to you and your family. If you cannot score us a very good on any question, please call the office to discuss how we could of made your experience a very good one. Thank you.

## 2023-02-27 NOTE — PROGRESS NOTES
Name: Theo Mckeon  : 1990         Chief Complaint:     Chief Complaint   Patient presents with    Varicose Veins     Following with Dr. Shara Trevizo ([facility]). Most recent appointment 2022 with vascular, chart review states the following: \"22 had LSSV RF and LAASV STS. Tough to access laasv with needle initially. Bruising after the procedure. (Probably related). Follow up duplex shows vessel closure ssv and aasv prox and mid thigh. Distal aasv was still open. Overall leg feels better. Prior treatment of [employer] was 22 with foam.\". Today, \" doing good\", states doing a lot better    Other     DDD:Upon review of requested records, patient had x-ray of the lumbar spine 3/2015 which showed mild narrowing of disc space L5-S1 which may imply degenerative disc disease. MRI lumbar spine completed 3/2015 showed no focal abnormalities. Lumbar XR 3/2022 showed minimal loss of disc space height at the lumbosacral junction. He completed several sessions of PT 2022 - 2022. Today, denies any issues. States its \"fine\"       Medication Check     Sertraline 50 mg, states its not helping with his sleep. Noticing more irritation, only sleeping 4-5 hours. History of Present Illness:      Theo Mckeon is a 27 y.o.  male who presents with Varicose Veins (Following with Dr. Shara Trevizo ([facility]). Most recent appointment 2022 with vascular, chart review states the following: \"22 had LSSV RF and LAASV STS. Tough to access laasv with needle initially. Bruising after the procedure. (Probably related). Follow up duplex shows vessel closure ssv and aasv prox and mid thigh. Distal aasv was still open. Overall leg feels better. Prior treatment of [employer] was 22 with foam.\".  Today, \" doing good\", states doing a lot better), Other (DDD:Upon review of requested records, patient had x-ray of the lumbar spine 3/2015 which showed mild narrowing of disc space L5-S1 which may imply degenerative disc disease. MRI lumbar spine completed 3/2015 showed no focal abnormalities. Lumbar XR 3/2022 showed minimal loss of disc space height at the lumbosacral junction. He completed several sessions of PT 5/2022 - 6/2022. Today, denies any issues. States its \"fine\" /), and Medication Check (Sertraline 50 mg, states its not helping with his sleep. Noticing more irritation, only sleeping 4-5 hours. )      HPI    Depression, Anxiety, PTSD:  Current treatment includes zoloft 50mg QD. Overall, mood described as having increased irritability. He is taking this medication before bed. He is sleeping only 4-5 hours nightly. He is having troubles staying asleep. Denies waking up to urinate. He believes that he does snore. Admits hand twitching, mostly at night. Denies racing thoughts that keep him awake at night. Denies thoughts of hurting self or others. He denies interest in counseling at this time, as he states he completed this through the [facility] with minimal relief. He is taking melatonin 5mg QHS. DDD:  Upon review of requested records, patient had x-ray of the lumbar spine 3/2015 which showed mild narrowing of disc space L5-S1 which may imply degenerative disc disease. MRI lumbar spine completed 3/2015 showed no focal abnormalities. Lumbar XR 3/2022 showed minimal loss of disc space height at the lumbosacral junction. He completed several sessions of PT 5/2022 - 6/2022. Today, he states his back pain is \"doing good\". He is doing PT exercises BID 5x per week. Denies concerns with lumbar ROM. Varicose Veins:  [facility] Earl [facility] Most recent appointment 12/21/2022 with vascular, chart review states the following: \"22 had LSSV RF and LAASV STS. Tough to access laasv with needle initially. Bruising after the procedure. (Probably related). Follow up duplex shows vessel closure ssv and aasv prox and mid thigh. Distal aasv was still open. Overall leg feels better.  Prior treatment of Oralia Baca was 4/12/22 with foam\". Today, he states his leg pain has completely resolved. He is scheduled to f/u with vascular 5/2023. Past Medical History:     Past Medical History:   Diagnosis Date    Depression     PTSD (post-traumatic stress disorder)       Reviewed all health maintenance requirements and ordered appropriate tests  There are no preventive care reminders to display for this patient. Past Surgical History:     Past Surgical History:   Procedure Laterality Date    DENTAL SURGERY      VARICOSE VEIN SURGERY Left 2014        Medications:       Prior to Admission medications    Medication Sig Start Date End Date Taking? Authorizing Provider   sertraline (ZOLOFT) 100 MG tablet Take 1 tablet by mouth daily 2/27/23  Yes AVNI Loredo CNP   melatonin 10 MG CAPS capsule Take 10 mg by mouth nightly   Yes Historical Provider, MD        Allergies:       Penicillins    Social History:     Tobacco:    reports that he has been smoking cigarettes. He has been smoking an average of .5 packs per day. He has never used smokeless tobacco.  Alcohol:      reports no history of alcohol use. Drug Use:  reports current drug use. Drug: Marijuana [place] Percy). Family History:     Family History   Problem Relation Age of Onset    Cancer Mother     High Blood Pressure Father     Stroke Father        Review of Systems:     Positive and Negative as described in HPI    Review of Systems   Psychiatric/Behavioral:  Positive for agitation and sleep disturbance. Physical Exam:   Vitals:  /80   Pulse 66   Temp 96.8 °F (36 °C)   Resp 18   Ht 5' 6\" (1.676 m)   Wt 150 lb (68 kg)   SpO2 99%   BMI 24.21 kg/m²     Physical Exam  Constitutional:       General: He is not in acute distress. Appearance: Normal appearance. He is normal weight. He is not ill-appearing or toxic-appearing. Cardiovascular:      Rate and Rhythm: Normal rate and regular rhythm. Heart sounds: Normal heart sounds.  No murmur heard.  Pulmonary:      Effort: Pulmonary effort is normal. No respiratory distress. Breath sounds: Normal breath sounds. No stridor. No wheezing, rhonchi or rales. Neurological:      Mental Status: He is alert. Psychiatric:         Mood and Affect: Mood normal.         Behavior: Behavior normal.         Thought Content: Thought content normal.       Data:     Lab Results   Component Value Date/Time     09/12/2022 09:15 AM    K 4.1 09/12/2022 09:15 AM     09/12/2022 09:15 AM    CO2 27 09/12/2022 09:15 AM    BUN 13 09/12/2022 09:15 AM    CREATININE 1.03 09/12/2022 09:15 AM    GLUCOSE 99 09/12/2022 09:15 AM    AST 17 09/12/2022 09:15 AM    ALT 21 09/12/2022 09:15 AM     Lab Results   Component Value Date/Time    WBC 11.2 09/12/2022 09:15 AM    RBC 4.62 09/12/2022 09:15 AM    HGB 14.7 09/12/2022 09:15 AM    HCT 44.4 09/12/2022 09:15 AM    MCV 96.1 09/12/2022 09:15 AM    MCH 31.8 09/12/2022 09:15 AM    MCHC 33.1 09/12/2022 09:15 AM    RDW 12.2 09/12/2022 09:15 AM     09/12/2022 09:15 AM    MPV 12.0 09/12/2022 09:15 AM     No results found for: TSH  Lab Results   Component Value Date/Time    CHOL 163 09/12/2022 09:16 AM    HDL 45 09/12/2022 09:16 AM    LABA1C 5.7 09/12/2022 09:15 AM       Assessment/Plan:      Diagnosis Orders   1. Depression, unspecified depression type        2. Anxiety        3. PTSD (post-traumatic stress disorder)        4. DDD (degenerative disc disease), lumbar        5. Asymptomatic varicose veins of both lower extremities            Varicose veins:  - Stable, asymptomatic   - Continue following with vascular Jessica Villalba, upcoming appointment 5/2023    DDD:  - Stable  - Continue physical therapy exercises at home    Insomnia, depression, anxiety, PTSD:  - Unstable  - Increase Zoloft from 50 mg to 100 mg daily.   Reviewed side effects.  - Follow-up 4 weeks or sooner with any concerns  - Offered counseling, patient states he has done this in the past with little relief  - Encouraged healthy diet, routine exercise, aiming for 8 hours of sleep nightly, yoga, meditation, deep breathing  -- Discussed his sleep concerns. Increase zoloft to assist with better controlling mood. Continue melatonin 5mg QHS. Consider adding trazodone or hydroxyzine. F/u 4 weeks. Completed Refills   Requested Prescriptions     Signed Prescriptions Disp Refills    sertraline (ZOLOFT) 100 MG tablet 30 tablet 5     Sig: Take 1 tablet by mouth daily       No orders of the defined types were placed in this encounter. No results found for this visit on 02/27/23. Return in about 4 weeks (around 3/27/2023), or if symptoms worsen or fail to improve, for zoloft medcheck .     Electronically signed by AVNI Perry CNP on 02/27/23 at 9:36 AM. Graft Donor Site Bandage (Optional-Leave Blank If You Don't Want In Note): Pressure bandage were applied to the donor site.

## 2023-03-22 RX ORDER — SERTRALINE HYDROCHLORIDE 100 MG/1
TABLET, FILM COATED ORAL
Qty: 30 TABLET | Refills: 5 | Status: SHIPPED | OUTPATIENT
Start: 2023-03-22

## 2023-03-27 ENCOUNTER — OFFICE VISIT (OUTPATIENT)
Dept: PRIMARY CARE CLINIC | Age: 33
End: 2023-03-27
Payer: COMMERCIAL

## 2023-03-27 VITALS
HEIGHT: 66 IN | TEMPERATURE: 98.6 F | SYSTOLIC BLOOD PRESSURE: 104 MMHG | DIASTOLIC BLOOD PRESSURE: 70 MMHG | RESPIRATION RATE: 18 BRPM | HEART RATE: 85 BPM | WEIGHT: 152 LBS | BODY MASS INDEX: 24.43 KG/M2 | OXYGEN SATURATION: 98 %

## 2023-03-27 DIAGNOSIS — F43.10 PTSD (POST-TRAUMATIC STRESS DISORDER): ICD-10-CM

## 2023-03-27 DIAGNOSIS — R73.03 PREDIABETES: ICD-10-CM

## 2023-03-27 DIAGNOSIS — Z00.00 WELLNESS EXAMINATION: ICD-10-CM

## 2023-03-27 DIAGNOSIS — Z13.220 LIPID SCREENING: ICD-10-CM

## 2023-03-27 DIAGNOSIS — F41.9 ANXIETY: Primary | ICD-10-CM

## 2023-03-27 DIAGNOSIS — F32.A DEPRESSION, UNSPECIFIED DEPRESSION TYPE: ICD-10-CM

## 2023-03-27 PROCEDURE — 99213 OFFICE O/P EST LOW 20 MIN: CPT | Performed by: NURSE PRACTITIONER

## 2023-03-27 RX ORDER — SERTRALINE HYDROCHLORIDE 100 MG/1
100 TABLET, FILM COATED ORAL DAILY
Qty: 90 TABLET | Refills: 3 | Status: SHIPPED | OUTPATIENT
Start: 2023-03-27

## 2023-03-27 NOTE — PROGRESS NOTES
of Onset    Cancer Mother     High Blood Pressure Father     Stroke Father        Review of Systems:     Positive and Negative as described in HPI    Review of Systems   Psychiatric/Behavioral:  Positive for dysphoric mood and sleep disturbance. Physical Exam:   Vitals:  /70   Pulse 85   Temp 98.6 °F (37 °C)   Resp 18   Ht 5' 6\" (1.676 m)   Wt 152 lb (68.9 kg)   SpO2 98%   BMI 24.53 kg/m²     Physical Exam  Constitutional:       General: He is not in acute distress. Appearance: Normal appearance. He is normal weight. He is not ill-appearing or toxic-appearing. Cardiovascular:      Rate and Rhythm: Normal rate and regular rhythm. Heart sounds: Normal heart sounds. No murmur heard. Pulmonary:      Effort: Pulmonary effort is normal. No respiratory distress. Breath sounds: Normal breath sounds. No stridor. No wheezing, rhonchi or rales. Neurological:      Mental Status: He is alert and oriented to person, place, and time. Psychiatric:         Mood and Affect: Mood normal.         Behavior: Behavior normal.         Thought Content:  Thought content normal.         Judgment: Judgment normal.       Data:     Lab Results   Component Value Date/Time     09/12/2022 09:15 AM    K 4.1 09/12/2022 09:15 AM     09/12/2022 09:15 AM    CO2 27 09/12/2022 09:15 AM    BUN 13 09/12/2022 09:15 AM    CREATININE 1.03 09/12/2022 09:15 AM    GLUCOSE 99 09/12/2022 09:15 AM    AST 17 09/12/2022 09:15 AM    ALT 21 09/12/2022 09:15 AM     Lab Results   Component Value Date/Time    WBC 11.2 09/12/2022 09:15 AM    RBC 4.62 09/12/2022 09:15 AM    HGB 14.7 09/12/2022 09:15 AM    HCT 44.4 09/12/2022 09:15 AM    MCV 96.1 09/12/2022 09:15 AM    MCH 31.8 09/12/2022 09:15 AM    MCHC 33.1 09/12/2022 09:15 AM    RDW 12.2 09/12/2022 09:15 AM     09/12/2022 09:15 AM    MPV 12.0 09/12/2022 09:15 AM     No results found for: TSH  Lab Results   Component Value Date/Time    CHOL 163 09/12/2022 09:16 AM

## 2023-03-27 NOTE — PATIENT INSTRUCTIONS
SURVEY:    You may be receiving a survey from [employer] regarding your visit today. Please complete the survey to enable us to provide the highest quality of care to you and your family. If you cannot score us a very good on any question, please call the office to discuss how we could of made your experience a very good one. Thank you.

## 2023-05-10 ENCOUNTER — OFFICE VISIT (OUTPATIENT)
Dept: VASCULAR SURGERY | Age: 33
End: 2023-05-10
Payer: COMMERCIAL

## 2023-05-10 VITALS
TEMPERATURE: 98.3 F | WEIGHT: 155.4 LBS | SYSTOLIC BLOOD PRESSURE: 121 MMHG | HEART RATE: 58 BPM | DIASTOLIC BLOOD PRESSURE: 73 MMHG | HEIGHT: 66 IN | RESPIRATION RATE: 18 BRPM | BODY MASS INDEX: 24.98 KG/M2

## 2023-05-10 DIAGNOSIS — I83.813 VARICOSE VEINS OF BILATERAL LOWER EXTREMITIES WITH PAIN: Primary | ICD-10-CM

## 2023-05-10 PROCEDURE — 99214 OFFICE O/P EST MOD 30 MIN: CPT | Performed by: INTERNAL MEDICINE

## 2023-05-10 NOTE — PROGRESS NOTES
Dorisdario Blackwell was seen on 5/10/2023 for   Chief Complaint   Patient presents with    1 Year Follow Up     Have some weakness left leg after standing. Hot and stinging    . 3/30/22 1st [facility] Vascular visit for VV. Referred by Kate May CNP. Has had trouble since 2013. Bulging painful vein. Grandmother had VV. He had stab phlebectomy left LATERAL knee, whereas painful vein is MEDIAL in [place] in 2014. No improvement. No persistent improvement with long term (>>3 mo) compression. Vein feels like it's on fire. Interferes with ADL. Interferes with sleep, work, etc.   [place]  in [address]. On feet a lot. Duplex today showed reflux at LCFV, FV, and pop. There is also reflux at SFJ, THIGH 1389 5.1 MM, KNEE 1694, SSV 6.5 without reflux. AASV prox 5.7  3533, mid 3.1  4700, distal 2.8  4511. The painful vein described by pt at medial knee connects to aasv and is 3.2 mm with 4772  General health is good. UPDATE 12/21/22 Often seen in [place] to accomodate work schedle. On 12/13/22 had LSSV RF and LAASV STS. Tough to access laasv with needle initially. Bruising after the procedure. (Probably related). Follow up duplex shows vessel closure ssv and aasv prox and mid thigh. Distal aasv was still open. Overall leg feels better. Prior treatment of Dianne Evans was 4/12/22 with foam.  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 He also has pain around knee, low anterior thigh. Social History     Socioeconomic History    Marital status:      Spouse name: Not on file    Number of children: Not on file    Years of education: Not on file    Highest education level: Not on file   Occupational History    Not on file   Tobacco Use    Smoking status: Every Day     Packs/day: 0.50     Types: Cigarettes    Smokeless tobacco: Never   Vaping Use    Vaping Use: Never used   Substance and Sexual Activity    Alcohol use: Never    Drug use:  Yes

## 2023-05-10 NOTE — PROGRESS NOTES
Nora Alicea was seen on 5/10/2023 for   Chief Complaint   Patient presents with    1 Year Follow Up     Have some weakness left leg after standing. Hot and stinging    .                             REVIEW OF SYSTEMS    Constitutional Weight: absent, A, Fatigue: absent Fever: absent   HEENT Ears: normal,Visual disturbance: absent Sore throat: absent,    Respiratory Shortness of breath: absent, Cough: absent;, Snoring: absent   Cardivascular Chest pain: absent,  Leg pain: absent,Leg swelling:absent, Non-healing wound:absent    GI Diarrhea: absent, Abdominal Pain: absent    Urinary frequency: absent, Urinary incontinence: absent   Musculoskeletal Neck pain: absent, Back pain: absent, Restless Leg:absent     Dermatological Hair loss: absent, Skin changes: absent   Neurological  Sudden Loss of Vision in one eye:absent, Slurred Speech:absent, Weakness on one side of body: present,Headache: absent   Psychiatric Anxiety: absent, Depression: absent   Hematologic Abnormal bleeding: absent,

## 2023-05-10 NOTE — PATIENT INSTRUCTIONS
SURVEY:    You may be receiving a survey from [employer] regarding your visit today. Please complete the survey to enable us to provide the highest quality of care to you and your family. If you cannot score us a very good on any question, please call the office to discuss how we could have made your experience a very good one. Thank you.

## 2023-05-18 DIAGNOSIS — I83.813 VARICOSE VEINS OF BILATERAL LOWER EXTREMITIES WITH PAIN: Primary | ICD-10-CM

## 2023-05-22 ENCOUNTER — HOSPITAL ENCOUNTER (OUTPATIENT)
Dept: VASCULAR LAB | Age: 33
Discharge: HOME | End: 2023-05-24
Payer: COMMERCIAL

## 2023-05-22 DIAGNOSIS — I83.813 VARICOSE VEINS OF BILATERAL LOWER EXTREMITIES WITH PAIN: ICD-10-CM

## 2023-05-22 PROCEDURE — 93971 EXTREMITY STUDY: CPT

## 2023-05-31 ENCOUNTER — OFFICE VISIT (OUTPATIENT)
Dept: VASCULAR SURGERY | Age: 33
End: 2023-05-31
Payer: COMMERCIAL

## 2023-05-31 VITALS
BODY MASS INDEX: 24.85 KG/M2 | TEMPERATURE: 97.7 F | HEART RATE: 59 BPM | WEIGHT: 154.6 LBS | RESPIRATION RATE: 18 BRPM | DIASTOLIC BLOOD PRESSURE: 70 MMHG | SYSTOLIC BLOOD PRESSURE: 114 MMHG | HEIGHT: 66 IN

## 2023-05-31 DIAGNOSIS — I83.813 VARICOSE VEINS OF BOTH LOWER EXTREMITIES WITH PAIN: Primary | ICD-10-CM

## 2023-05-31 PROCEDURE — 99213 OFFICE O/P EST LOW 20 MIN: CPT | Performed by: INTERNAL MEDICINE

## 2023-05-31 NOTE — PROGRESS NOTES
Nafisa Gama was seen on 5/31/2023 for   Chief Complaint   Patient presents with    Follow-up     US results   . 3/30/22 1st [facility] Vascular visit for VV. Referred by Brina King CNP. Has had trouble since 2013. Bulging painful vein. Grandmother had VV. He had stab phlebectomy left LATERAL knee, whereas painful vein is MEDIAL in [place] in 2014. No improvement. No persistent improvement with long term (>>3 mo) compression. Vein feels like it's on fire. Interferes with ADL. Interferes with sleep, work, etc.   [employer]  in [employer]. On feet a lot. Duplex today showed reflux at LCFV, FV, and pop. There is also reflux at SFJ, THIGH 1389 5.1 MM, KNEE 1694, SSV 6.5 without reflux. AASV prox 5.7  3533, mid 3.1  4700, distal 2.8  4511. The painful vein described by pt at medial knee connects to aasv and is 3.2 mm with 4772  General health is good. UPDATE 12/21/22 Often seen in [place] to accomodate work schedle. On 12/13/22 had LSSV RF and LAASV STS. Tough to access laasv with needle initially. Bruising after the procedure. (Probably related). Follow up duplex shows vessel closure ssv and aasv prox and mid thigh. Distal aasv was still open. Overall leg feels better. Prior treatment of Nelson Villasenor was 4/12/22 with foam.  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 He also has pain around knee, low anterior thigh. \  UPDATE 5/31/23 US showed no adequate targets for treatment. Mid and dist left ssv 2 mm. Thigh branch 1.3 mm. Ant thigh 2.2 mm 880 ms.      Social History     Socioeconomic History    Marital status:      Spouse name: Not on file    Number of children: Not on file    Years of education: Not on file    Highest education level: Not on file   Occupational History    Not on file   Tobacco Use    Smoking status: Every Day     Packs/day: 0.50     Types: Cigarettes    Smokeless tobacco: Never   Vaping Use    Vaping Use:

## 2023-05-31 NOTE — PATIENT INSTRUCTIONS
SURVEY:    You may be receiving a survey from [employer] regarding your visit today. Please complete the survey to enable us to provide the highest quality of care to you and your family. If you cannot score us a very good on any question, please call the office to discuss how we could have made your experience a very good one. Thank you.

## 2023-05-31 NOTE — PROGRESS NOTES
Jose Brewer was seen on 5/31/2023 for   Chief Complaint   Patient presents with    Follow-up     US results   .                             REVIEW OF SYSTEMS    Constitutional Weight: absent, A, Fatigue: absent Fever: absent   HEENT Ears: normal,Visual disturbance: absent Sore throat: absent,    Respiratory Shortness of breath: absent, Cough: absent;, Snoring: absent   Cardivascular Chest pain: absent,  Leg pain: present,Leg swelling:absent, Non-healing wound:absent    GI Diarrhea: absent, Abdominal Pain: absent    Urinary frequency: absent, Urinary incontinence: absent   Musculoskeletal Neck pain: absent, Back pain: absent, Restless Leg:absent     Dermatological Hair loss: absent, Skin changes: absent   Neurological  Sudden Loss of Vision in one eye:absent, Slurred Speech:absent, Weakness on one side of body: absent,Headache: absent   Psychiatric Anxiety: absent, Depression: absent   Hematologic Abnormal bleeding: absent,

## 2023-09-21 ENCOUNTER — HOSPITAL ENCOUNTER (OUTPATIENT)
Age: 33
Discharge: HOME | End: 2023-09-21
Payer: COMMERCIAL

## 2023-09-21 DIAGNOSIS — R73.03 PREDIABETES: ICD-10-CM

## 2023-09-21 DIAGNOSIS — Z13.220 LIPID SCREENING: ICD-10-CM

## 2023-09-21 DIAGNOSIS — Z00.00 WELLNESS EXAMINATION: ICD-10-CM

## 2023-09-21 LAB
ALBUMIN SERPL-MCNC: 4.4 G/DL (ref 3.5–5.2)
ALBUMIN/GLOB SERPL: 1.6 {RATIO} (ref 1–2.5)
ALP SERPL-CCNC: 87 U/L (ref 40–129)
ALT SERPL-CCNC: 26 U/L (ref 5–41)
ANION GAP SERPL CALCULATED.3IONS-SCNC: 7 MMOL/L (ref 9–17)
AST SERPL-CCNC: 22 U/L
BILIRUB SERPL-MCNC: 0.3 MG/DL (ref 0.3–1.2)
BUN SERPL-MCNC: 14 MG/DL (ref 6–20)
BUN/CREAT SERPL: 12 (ref 9–20)
CALCIUM SERPL-MCNC: 9.3 MG/DL (ref 8.6–10.4)
CHLORIDE SERPL-SCNC: 101 MMOL/L (ref 98–107)
CHOLEST SERPL-MCNC: 164 MG/DL
CHOLESTEROL/HDL RATIO: 4.2
CO2 SERPL-SCNC: 26 MMOL/L (ref 20–31)
CREAT SERPL-MCNC: 1.2 MG/DL (ref 0.7–1.2)
ERYTHROCYTE [DISTWIDTH] IN BLOOD BY AUTOMATED COUNT: 12.1 % (ref 11.8–14.4)
GFR SERPL CREATININE-BSD FRML MDRD: >60 ML/MIN/1.73M2
GLUCOSE SERPL-MCNC: 99 MG/DL (ref 70–99)
HCT VFR BLD AUTO: 43.6 % (ref 40.7–50.3)
HDLC SERPL-MCNC: 39 MG/DL
HGB BLD-MCNC: 15.1 G/DL (ref 13–17)
LDLC SERPL CALC-MCNC: 90 MG/DL (ref 0–130)
MCH RBC QN AUTO: 32.3 PG (ref 25.2–33.5)
MCHC RBC AUTO-ENTMCNC: 34.6 G/DL (ref 28.4–34.8)
MCV RBC AUTO: 93.2 FL (ref 82.6–102.9)
NRBC BLD-RTO: 0 PER 100 WBC
PLATELET # BLD AUTO: 292 K/UL (ref 138–453)
PMV BLD AUTO: 11.2 FL (ref 8.1–13.5)
POTASSIUM SERPL-SCNC: 4.1 MMOL/L (ref 3.7–5.3)
PROT SERPL-MCNC: 7.2 G/DL (ref 6.4–8.3)
RBC # BLD AUTO: 4.68 M/UL (ref 4.21–5.77)
SODIUM SERPL-SCNC: 134 MMOL/L (ref 135–144)
TRIGL SERPL-MCNC: 175 MG/DL
WBC OTHER # BLD: 13.3 K/UL (ref 3.5–11.3)

## 2023-09-21 PROCEDURE — 80061 LIPID PANEL: CPT

## 2023-09-21 PROCEDURE — 85027 COMPLETE CBC AUTOMATED: CPT

## 2023-09-21 PROCEDURE — 36415 COLL VENOUS BLD VENIPUNCTURE: CPT

## 2023-09-21 PROCEDURE — 80053 COMPREHEN METABOLIC PANEL: CPT

## 2023-09-21 PROCEDURE — 83036 HEMOGLOBIN GLYCOSYLATED A1C: CPT

## 2023-09-22 LAB
EST. AVERAGE GLUCOSE BLD GHB EST-MCNC: 105 MG/DL
HBA1C MFR BLD: 5.3 % (ref 4–6)

## 2023-10-02 ENCOUNTER — OFFICE VISIT (OUTPATIENT)
Dept: PRIMARY CARE CLINIC | Age: 33
End: 2023-10-02
Payer: COMMERCIAL

## 2023-10-02 VITALS
WEIGHT: 155 LBS | DIASTOLIC BLOOD PRESSURE: 78 MMHG | OXYGEN SATURATION: 98 % | HEART RATE: 78 BPM | RESPIRATION RATE: 18 BRPM | SYSTOLIC BLOOD PRESSURE: 124 MMHG | BODY MASS INDEX: 24.91 KG/M2 | TEMPERATURE: 98 F | HEIGHT: 66 IN

## 2023-10-02 DIAGNOSIS — D72.829 LEUKOCYTOSIS, UNSPECIFIED TYPE: ICD-10-CM

## 2023-10-02 DIAGNOSIS — F41.9 ANXIETY: ICD-10-CM

## 2023-10-02 DIAGNOSIS — Z00.00 WELLNESS EXAMINATION: Primary | ICD-10-CM

## 2023-10-02 DIAGNOSIS — F32.A DEPRESSION, UNSPECIFIED DEPRESSION TYPE: ICD-10-CM

## 2023-10-02 DIAGNOSIS — F43.10 PTSD (POST-TRAUMATIC STRESS DISORDER): ICD-10-CM

## 2023-10-02 PROCEDURE — 99213 OFFICE O/P EST LOW 20 MIN: CPT | Performed by: NURSE PRACTITIONER

## 2023-10-02 PROCEDURE — 99395 PREV VISIT EST AGE 18-39: CPT | Performed by: NURSE PRACTITIONER

## 2023-10-02 RX ORDER — SERTRALINE HYDROCHLORIDE 100 MG/1
150 TABLET, FILM COATED ORAL DAILY
Qty: 90 TABLET | Refills: 3
Start: 2023-10-02

## 2023-10-02 ASSESSMENT — ENCOUNTER SYMPTOMS
SINUS PRESSURE: 0
COUGH: 0
CONSTIPATION: 0
DIARRHEA: 0
SHORTNESS OF BREATH: 0
WHEEZING: 0
SINUS PAIN: 0
RHINORRHEA: 0
SORE THROAT: 0
ABDOMINAL PAIN: 0

## 2023-10-02 NOTE — PROGRESS NOTES
Name: Giovanni Beth  : 1990         Chief Complaint:     Chief Complaint   Patient presents with    Annual Exam     WELLNESS       History of Present Illness:      Giovanni Beth is a 27 y.o.  male who presents with Annual Exam (Jes Jacobs)      HPI    Wellness:  He presents for wellness visit and denies concerns. He admits well balanced diet. For exercise he notes \"a lot of walking\". He is planning to schedule routine eye exams. He denies routine dental exams. He is vaccinated for covid. Most recent tetanus vaccine 2010. He is not UTD pneumococcal vaccine. He denies family history of colorectal cancer or prostate cancer. He is a current 1/2 PPD smoker. Depression, Anxiety, PTSD:  Current treatment includes zoloft 100mg QD. He states this helps to control mood. He admits troubles staying and falling asleep. He admits having violent dreams. Sleep has worsened since he cut down on smoking. Past Medical History:     Past Medical History:   Diagnosis Date    Depression     PTSD (post-traumatic stress disorder)       Reviewed all health maintenance requirements and ordered appropriate tests  Health Maintenance Due   Topic Date Due    Pneumococcal 0-64 years Vaccine (1 - PCV) Never done    DTaP/Tdap/Td vaccine (7 - Td or Tdap) 2020    Flu vaccine (1) Never done       Past Surgical History:     Past Surgical History:   Procedure Laterality Date    DENTAL SURGERY      VARICOSE VEIN SURGERY Left         Medications:       Prior to Admission medications    Medication Sig Start Date End Date Taking? Authorizing Provider   sertraline (ZOLOFT) 100 MG tablet Take 1.5 tablets by mouth daily 10/2/23  Yes AVNI Fernando CNP        Allergies:       Penicillins    Social History:     Tobacco:    reports that he has been smoking cigarettes. He has been smoking an average of .5 packs per day. He has never used smokeless tobacco.  Alcohol:      reports no history of alcohol use.   Drug Use:  reports

## 2023-10-02 NOTE — PATIENT INSTRUCTIONS
SURVEY:    You may be receiving a survey from [employer] regarding your visit today. Please complete the survey to enable us to provide the highest quality of care to you and your family. If you cannot score us a very good on any question, please call the office to discuss how we could of made your experience a very good one. Thank you.

## 2024-01-08 ENCOUNTER — OFFICE VISIT (OUTPATIENT)
Dept: PRIMARY CARE CLINIC | Age: 34
End: 2024-01-08
Payer: COMMERCIAL

## 2024-01-08 VITALS
HEIGHT: 66 IN | BODY MASS INDEX: 25.55 KG/M2 | OXYGEN SATURATION: 98 % | WEIGHT: 159 LBS | HEART RATE: 69 BPM | DIASTOLIC BLOOD PRESSURE: 58 MMHG | SYSTOLIC BLOOD PRESSURE: 92 MMHG | TEMPERATURE: 97 F

## 2024-01-08 DIAGNOSIS — Z23 NEED FOR VACCINATION: ICD-10-CM

## 2024-01-08 DIAGNOSIS — M25.522 PAIN OF BOTH ELBOWS: Primary | ICD-10-CM

## 2024-01-08 DIAGNOSIS — G47.00 INSOMNIA, UNSPECIFIED TYPE: ICD-10-CM

## 2024-01-08 DIAGNOSIS — M25.521 PAIN OF BOTH ELBOWS: Primary | ICD-10-CM

## 2024-01-08 DIAGNOSIS — M79.9 SOFT TISSUE LESION OF ELBOW REGION: ICD-10-CM

## 2024-01-08 DIAGNOSIS — F32.A DEPRESSION, UNSPECIFIED DEPRESSION TYPE: ICD-10-CM

## 2024-01-08 DIAGNOSIS — F43.10 PTSD (POST-TRAUMATIC STRESS DISORDER): ICD-10-CM

## 2024-01-08 DIAGNOSIS — F41.9 ANXIETY: ICD-10-CM

## 2024-01-08 PROCEDURE — 90674 CCIIV4 VAC NO PRSV 0.5 ML IM: CPT | Performed by: NURSE PRACTITIONER

## 2024-01-08 PROCEDURE — 99214 OFFICE O/P EST MOD 30 MIN: CPT | Performed by: NURSE PRACTITIONER

## 2024-01-08 PROCEDURE — 90715 TDAP VACCINE 7 YRS/> IM: CPT | Performed by: NURSE PRACTITIONER

## 2024-01-08 PROCEDURE — 90471 IMMUNIZATION ADMIN: CPT | Performed by: NURSE PRACTITIONER

## 2024-01-08 PROCEDURE — 90472 IMMUNIZATION ADMIN EACH ADD: CPT | Performed by: NURSE PRACTITIONER

## 2024-01-08 ASSESSMENT — PATIENT HEALTH QUESTIONNAIRE - PHQ9
5. POOR APPETITE OR OVEREATING: 0
7. TROUBLE CONCENTRATING ON THINGS, SUCH AS READING THE NEWSPAPER OR WATCHING TELEVISION: 0
6. FEELING BAD ABOUT YOURSELF - OR THAT YOU ARE A FAILURE OR HAVE LET YOURSELF OR YOUR FAMILY DOWN: 0
SUM OF ALL RESPONSES TO PHQ9 QUESTIONS 1 & 2: 0
SUM OF ALL RESPONSES TO PHQ QUESTIONS 1-9: 1
10. IF YOU CHECKED OFF ANY PROBLEMS, HOW DIFFICULT HAVE THESE PROBLEMS MADE IT FOR YOU TO DO YOUR WORK, TAKE CARE OF THINGS AT HOME, OR GET ALONG WITH OTHER PEOPLE: 0
9. THOUGHTS THAT YOU WOULD BE BETTER OFF DEAD, OR OF HURTING YOURSELF: 0
SUM OF ALL RESPONSES TO PHQ QUESTIONS 1-9: 1
2. FEELING DOWN, DEPRESSED OR HOPELESS: 0
1. LITTLE INTEREST OR PLEASURE IN DOING THINGS: 0
3. TROUBLE FALLING OR STAYING ASLEEP: 1
4. FEELING TIRED OR HAVING LITTLE ENERGY: 0
8. MOVING OR SPEAKING SO SLOWLY THAT OTHER PEOPLE COULD HAVE NOTICED. OR THE OPPOSITE, BEING SO FIGETY OR RESTLESS THAT YOU HAVE BEEN MOVING AROUND A LOT MORE THAN USUAL: 0

## 2024-01-08 NOTE — PROGRESS NOTES
Name: Elpidio Gillespie  : 1990         Chief Complaint:     Chief Complaint   Patient presents with    3 Month Follow-Up     -zoloft med check  -he feels the Zoloft helps him focus  -denies any issues getting things done  -sleep troubles   -some nights are worse than others  -trouble staying asleep         History of Present Illness:      Elpidio Gillespie is a 33 y.o.  male who presents with 3 Month Follow-Up (-zoloft med check/-he feels the Zoloft helps him focus/-denies any issues getting things done/-sleep troubles /-some nights are worse than others/-trouble staying asleep/)      HPI    The patient presents for follow-up.  He has mental health history of depression, anxiety, PTSD.  He is currently taking Zoloft 150 mg daily.  Mood wise he states he is doing well. Denies peaks in anxiety or low, sad thoughts. He denies thoughts of hurting self or others.  In regards to sleep, \"some days are better than others\". He admits troubles staying asleep. He is able to fall back asleep after 20 minutes. He does snore at night. Denies apnea episodes. He admits feeling tired throughout the day. He admits few nightmares, not very often. He is drinking 2 energy drinks daily, the second around 2-3 pm. He does note discomfort on the \"bumps\" on his elbows bilaterally.     Past Medical History:     Past Medical History:   Diagnosis Date    Depression     PTSD (post-traumatic stress disorder)       Reviewed all health maintenance requirements and ordered appropriate tests  Health Maintenance Due   Topic Date Due    Pneumococcal 0-64 years Vaccine (1 - PCV) Never done    COVID-19 Vaccine (3 2023-24 season) 2023       Past Surgical History:     Past Surgical History:   Procedure Laterality Date    DENTAL SURGERY      VARICOSE VEIN SURGERY Left         Medications:       Prior to Admission medications    Medication Sig Start Date End Date Taking? Authorizing Provider   sertraline (ZOLOFT) 100 MG tablet Take 1.5 tablets by

## 2024-05-29 ENCOUNTER — OFFICE VISIT (OUTPATIENT)
Dept: VASCULAR SURGERY | Age: 34
End: 2024-05-29
Payer: COMMERCIAL

## 2024-05-29 VITALS
SYSTOLIC BLOOD PRESSURE: 137 MMHG | TEMPERATURE: 97.6 F | HEIGHT: 66 IN | DIASTOLIC BLOOD PRESSURE: 60 MMHG | WEIGHT: 145.9 LBS | HEART RATE: 66 BPM | BODY MASS INDEX: 23.45 KG/M2 | RESPIRATION RATE: 16 BRPM

## 2024-05-29 DIAGNOSIS — I83.813 VARICOSE VEINS OF BILATERAL LOWER EXTREMITIES WITH PAIN: Primary | ICD-10-CM

## 2024-05-29 PROCEDURE — 99213 OFFICE O/P EST LOW 20 MIN: CPT | Performed by: INTERNAL MEDICINE

## 2024-05-29 NOTE — PROGRESS NOTES
Elpidio Gillespie was seen on 5/29/2024 for   Chief Complaint   Patient presents with    Varicose Veins     1 year follow up. Patient reports intermittent pain in left calf. Denies leg swelling.   .                            REVIEW OF SYSTEMS    Constitutional Weight: absent, A, Fatigue: absent Fever: absent   HEENT Ears: normal,Visual disturbance: absent Sore throat: absent,    Respiratory Shortness of breath: absent, Cough: present;, Snoring: absent   Cardivascular Chest pain: absent,  Leg pain: present,Leg swelling:absent, Non-healing wound:absent    GI Diarrhea: absent, Abdominal Pain: absent    Urinary frequency: absent, Urinary incontinence: absent   Musculoskeletal Neck pain: absent, Back pain: absent, Restless Leg:absent     Dermatological Hair loss: absent, Skin changes: absent   Neurological  Sudden Loss of Vision in one eye:absent, Slurred Speech:absent, Weakness on one side of body: absent,Headache: absent   Psychiatric Anxiety: absent, Depression: present   Hematologic Abnormal bleeding: absent,

## 2024-05-29 NOTE — PROGRESS NOTES
Elpidio Gillespie was seen on 5/29/2024 for   Chief Complaint   Patient presents with    Varicose Veins     1 year follow up. Patient reports intermittent pain in left calf. Denies leg swelling.   .  3/30/22 1st [facility] visit for VV. Referred by Stacie Gamble CNP. Has had trouble since 2013. Bulging painful vein. Grandmother had VV. He had stab phlebectomy left LATERAL knee, whereas painful vein is MEDIAL in [place] in 2014. No improvement. No persistent improvement with long term (>>3 mo) compression. Vein feels like it's on fire. Interferes with ADL. Interferes with sleep, work, etc.   [place]  in [place]. On feet a lot.   Duplex today showed reflux at LCFV, FV, and pop. There is also reflux at SFJ, THIGH 1389 5.1 MM, KNEE 1694, SSV 6.5 without reflux. AASV prox 5.7  3533, mid 3.1  4700, distal 2.8  4511. The painful vein described by pt at medial knee connects to aasv and is 3.2 mm with 4772  General health is good.  UPDATE 12/21/22 Often seen in [place] to accomodate work schedle. On 12/13/22 had LSSV RF and LAASV STS. Tough to access laasv with needle initially. Bruising after the procedure. (Probably related). Follow up duplex shows vessel closure ssv and aasv prox and mid thigh. Distal aasv was still open. Overall leg feels better. Prior treatment of LAASV was 4/12/22 with foam.  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 He also has pain around knee, low anterior thigh. \  UPDATE 5/31/23 US showed no adequate targets for treatment. Mid and dist left ssv 2 mm. Thigh branch 1.3 mm. Ant thigh 2.2 mm 880 ms.   UPDATE 5/29/24 Doing well. 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 Veins about the same.             Social History     Socioeconomic History    Marital status:      Spouse name: Not on file    Number of children: Not on file    Years of education: Not on file    Highest education level:

## 2024-05-29 NOTE — PATIENT INSTRUCTIONS
SURVEY:    Thank you for allowing us to care for you today.    You may be receiving a survey from [facility] regarding your visit today- electronically or via mail.      Please help us by completing the survey as this will provide the needed feedback to ensure we are providing the very best care for you and your family.    If you cannot score us a very good on any question, please call the office to discuss how we could have made your experience a very good one.    Thank you.       STAFF:    Yarely Szymanski, Ai Wagner, Deepika Perez      CLINICAL STAFF:    Rosemarie Buchanan LPN, Amelia Knowles LPN, Bettie Escalante LPN, Elena Mcelroy CMA

## 2024-07-12 SDOH — ECONOMIC STABILITY: TRANSPORTATION INSECURITY
IN THE PAST 12 MONTHS, HAS LACK OF TRANSPORTATION KEPT YOU FROM MEETINGS, WORK, OR FROM GETTING THINGS NEEDED FOR DAILY LIVING?: NO

## 2024-07-12 SDOH — ECONOMIC STABILITY: INCOME INSECURITY: HOW HARD IS IT FOR YOU TO PAY FOR THE VERY BASICS LIKE FOOD, HOUSING, MEDICAL CARE, AND HEATING?: NOT HARD AT ALL

## 2024-07-12 SDOH — ECONOMIC STABILITY: FOOD INSECURITY: WITHIN THE PAST 12 MONTHS, THE FOOD YOU BOUGHT JUST DIDN'T LAST AND YOU DIDN'T HAVE MONEY TO GET MORE.: NEVER TRUE

## 2024-07-12 SDOH — ECONOMIC STABILITY: FOOD INSECURITY: WITHIN THE PAST 12 MONTHS, YOU WORRIED THAT YOUR FOOD WOULD RUN OUT BEFORE YOU GOT MONEY TO BUY MORE.: NEVER TRUE

## 2024-07-15 ENCOUNTER — OFFICE VISIT (OUTPATIENT)
Dept: PRIMARY CARE CLINIC | Age: 34
End: 2024-07-15
Payer: COMMERCIAL

## 2024-07-15 VITALS
HEIGHT: 66 IN | WEIGHT: 145 LBS | TEMPERATURE: 97.1 F | BODY MASS INDEX: 23.3 KG/M2 | DIASTOLIC BLOOD PRESSURE: 72 MMHG | SYSTOLIC BLOOD PRESSURE: 112 MMHG | HEART RATE: 78 BPM | RESPIRATION RATE: 18 BRPM | OXYGEN SATURATION: 98 %

## 2024-07-15 DIAGNOSIS — F41.9 ANXIETY: Primary | ICD-10-CM

## 2024-07-15 DIAGNOSIS — F43.10 PTSD (POST-TRAUMATIC STRESS DISORDER): ICD-10-CM

## 2024-07-15 DIAGNOSIS — Z00.00 WELLNESS EXAMINATION: ICD-10-CM

## 2024-07-15 DIAGNOSIS — F33.42 RECURRENT MAJOR DEPRESSIVE DISORDER, IN FULL REMISSION (HCC): ICD-10-CM

## 2024-07-15 PROCEDURE — 99213 OFFICE O/P EST LOW 20 MIN: CPT | Performed by: NURSE PRACTITIONER

## 2024-07-15 ASSESSMENT — PATIENT HEALTH QUESTIONNAIRE - PHQ9
SUM OF ALL RESPONSES TO PHQ QUESTIONS 1-9: 0
9. THOUGHTS THAT YOU WOULD BE BETTER OFF DEAD, OR OF HURTING YOURSELF: NOT AT ALL
SUM OF ALL RESPONSES TO PHQ9 QUESTIONS 1 & 2: 0
3. TROUBLE FALLING OR STAYING ASLEEP: NOT AT ALL
10. IF YOU CHECKED OFF ANY PROBLEMS, HOW DIFFICULT HAVE THESE PROBLEMS MADE IT FOR YOU TO DO YOUR WORK, TAKE CARE OF THINGS AT HOME, OR GET ALONG WITH OTHER PEOPLE: NOT DIFFICULT AT ALL
5. POOR APPETITE OR OVEREATING: NOT AT ALL
2. FEELING DOWN, DEPRESSED OR HOPELESS: NOT AT ALL
4. FEELING TIRED OR HAVING LITTLE ENERGY: NOT AT ALL
7. TROUBLE CONCENTRATING ON THINGS, SUCH AS READING THE NEWSPAPER OR WATCHING TELEVISION: NOT AT ALL
SUM OF ALL RESPONSES TO PHQ QUESTIONS 1-9: 0
8. MOVING OR SPEAKING SO SLOWLY THAT OTHER PEOPLE COULD HAVE NOTICED. OR THE OPPOSITE, BEING SO FIGETY OR RESTLESS THAT YOU HAVE BEEN MOVING AROUND A LOT MORE THAN USUAL: NOT AT ALL
6. FEELING BAD ABOUT YOURSELF - OR THAT YOU ARE A FAILURE OR HAVE LET YOURSELF OR YOUR FAMILY DOWN: NOT AT ALL
1. LITTLE INTEREST OR PLEASURE IN DOING THINGS: NOT AT ALL

## 2024-07-15 NOTE — PROGRESS NOTES
Name: Elpidio Gillespie  : 1990         Chief Complaint:     Chief Complaint   Patient presents with    Anxiety     Patient is here for anixety follow up.   Denies any new symptoms of depressed mood  Denies any concerns at this time  Medication includes: Zoloft 150mg     Insomnia     Patient is here for follow up on insomnia  Average of 5-7 hours of sleep a night.  States he doesn't take anything for sleep.   Doing well/voices no concerns.        History of Present Illness:      Elpidio Gillespie is a 33 y.o.  male who presents with Anxiety (Patient is here for anixety follow up. /Denies any new symptoms of depressed mood/Denies any concerns at this time/Medication includes: Zoloft 150mg ) and Insomnia (Patient is here for follow up on insomnia/Average of 5-7 hours of sleep a night./States he doesn't take anything for sleep. /Doing well/voices no concerns. )      HPI    Mental Health:  The patient has a history of depression, anxiety, and PTSD. He is prescribed zoloft 150mg QD, but patient states that he is taking this medication \"just on bad days\". He has not taken this medication for 6 months. He denies concerns with insomnia. He denies difficulty with staying or falling asleep. He is sleeping 5-7 hours nightly. He admits single episode of waking up abruptly during the middle of the night. Denies thoughts of hurting self or others. [facility]       Past Medical History:     Past Medical History:   Diagnosis Date    Depression     PTSD (post-traumatic stress disorder)       Reviewed all health maintenance requirements and ordered appropriate tests  Health Maintenance Due   Topic Date Due    COVID-19 Vaccine (3 - 2023-24 season) 2023       Past Surgical History:     Past Surgical History:   Procedure Laterality Date    DENTAL SURGERY      VARICOSE VEIN SURGERY Left         Medications:       Prior to Admission medications    Not on File        Allergies:

## 2024-09-18 ENCOUNTER — HOSPITAL ENCOUNTER (OUTPATIENT)
Age: 34
Discharge: HOME | End: 2024-09-18
Payer: COMMERCIAL

## 2024-09-18 LAB
HCT VFR BLD AUTO: 40.4 % (ref 40.7–50.3)
HGB BLD-MCNC: 13.8 G/DL (ref 13–17)

## 2024-09-18 PROCEDURE — 36415 COLL VENOUS BLD VENIPUNCTURE: CPT

## 2024-09-18 PROCEDURE — 85018 HEMOGLOBIN: CPT

## 2024-09-18 PROCEDURE — 85014 HEMATOCRIT: CPT

## 2024-10-17 ENCOUNTER — LAB (OUTPATIENT)
Dept: PRIMARY CARE CLINIC | Age: 34
End: 2024-10-17

## 2024-10-17 VITALS — OXYGEN SATURATION: 96 % | HEART RATE: 82 BPM | BODY MASS INDEX: 24.53 KG/M2 | WEIGHT: 152 LBS

## 2024-10-17 DIAGNOSIS — H61.23 BILATERAL IMPACTED CERUMEN: Primary | ICD-10-CM

## 2024-10-17 RX ORDER — HYDROCODONE BITARTRATE AND ACETAMINOPHEN 5; 325 MG/1; MG/1
TABLET ORAL
COMMUNITY
Start: 2024-10-09

## 2024-10-17 NOTE — PROGRESS NOTES
[address] [address]  Dept: [phone]    Ear Cerumen Removal Procedure Note  Indication: ear cerumen impaction    Procedure: After placing the patient's head in the appropriate position, the patient's bilateral ear canal was irrigated with warm water until all cerumen was removed and the ear canal was clear.      A currette  was used for this procedure.    The patient tolerated the procedure well.    JANAE Jackson examined the patient's ears post irrigation confirming that the canals are clear bilaterally.

## 2025-06-04 ENCOUNTER — OFFICE VISIT (OUTPATIENT)
Dept: VASCULAR SURGERY | Age: 35
End: 2025-06-04
Payer: COMMERCIAL

## 2025-06-04 VITALS
DIASTOLIC BLOOD PRESSURE: 71 MMHG | RESPIRATION RATE: 16 BRPM | HEART RATE: 63 BPM | BODY MASS INDEX: 24.11 KG/M2 | WEIGHT: 150 LBS | SYSTOLIC BLOOD PRESSURE: 114 MMHG | TEMPERATURE: 97.1 F | HEIGHT: 66 IN

## 2025-06-04 DIAGNOSIS — I83.813 VARICOSE VEINS OF BILATERAL LOWER EXTREMITIES WITH PAIN: Primary | ICD-10-CM

## 2025-06-04 PROCEDURE — 99213 OFFICE O/P EST LOW 20 MIN: CPT | Performed by: INTERNAL MEDICINE

## 2025-06-04 NOTE — PROGRESS NOTES
Elpidio Gillespie was seen on 6/4/2025 for   Chief Complaint   Patient presents with    Varicose Veins     1 year follow up. Patient reports continued soreness in left thigh. Denies leg swelling.                               REVIEW OF SYSTEMS    Constitutional Weight: absent, A, Fatigue: absent Fever: absent   HEENT Ears: normal,Visual disturbance: absent Sore throat: absent,    Respiratory Shortness of breath: absent, Cough: absent;, Snoring: absent   Cardivascular Chest pain: absent,  Leg pain: present,Leg swelling:absent, Non-healing wound:absent    GI Diarrhea: absent, Abdominal Pain: absent    Urinary frequency: absent, Urinary incontinence: absent   Musculoskeletal Neck pain: absent, Back pain: absent, Restless Leg:absent     Dermatological Hair loss: absent, Skin changes: absent   Neurological  Sudden Loss of Vision in one eye:absent, Slurred Speech:absent, Weakness on one side of body: absent,Headache: absent   Psychiatric Anxiety: absent, Depression: absent   Hematologic Abnormal bleeding: absent,

## 2025-06-04 NOTE — PROGRESS NOTES
Elpidio Gillespie was seen on 6/4/2025 for   Chief Complaint   Patient presents with    Varicose Veins     1 year follow up. Patient reports continued soreness in left thigh. Denies leg swelling.   .  3/30/22 1st [facility] Vascular visit for VV. Referred by Stacie Gamble CNP. Has had trouble since 2013. Bulging painful vein. Grandmother had VV. He had stab phlebectomy left LATERAL knee, whereas painful vein is MEDIAL in [place] in 2014. No improvement. No persistent improvement with long term (>>3 mo) compression. Vein feels like it's on fire. Interferes with ADL. Interferes with sleep, work, etc.   [place]  in [place]. On feet a lot.   Duplex today showed reflux at LCFV, FV, and pop. There is also reflux at SFJ, THIGH 1389 5.1 MM, KNEE 1694, SSV 6.5 without reflux. AASV prox 5.7  3533, mid 3.1  4700, distal 2.8  4511. The painful vein described by pt at medial knee connects to aasv and is 3.2 mm with 4772  General health is good.  UPDATE 12/21/22 Often seen in [place] to accomodate work schedle. On 12/13/22 had LSSV RF and LAASV STS. Tough to access laasv with needle initially. Bruising after the procedure. (Probably related). Follow up duplex shows vessel closure ssv and aasv prox and mid thigh. Distal aasv was still open. Overall leg feels better. Prior treatment of LAASV was 4/12/22 with foam.  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 He also has pain around knee, low anterior thigh.   UPDATE 5/31/23 US showed no adequate targets for treatment. Mid and dist left ssv 2 mm. Thigh branch 1.3 mm. Ant thigh 2.2 mm 880 ms.   UPDATE 5/29/24 Doing well. 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 Veins about the same.   UPDATE 6/4/25 has some tenderness, especially left thigh. Unchanged over past year. Overall doing the same as a year ago. No bulging veins.  Full time study [place].              Social History

## 2025-08-14 SDOH — ECONOMIC STABILITY: FOOD INSECURITY: WITHIN THE PAST 12 MONTHS, THE FOOD YOU BOUGHT JUST DIDN'T LAST AND YOU DIDN'T HAVE MONEY TO GET MORE.: NEVER TRUE

## 2025-08-14 SDOH — ECONOMIC STABILITY: INCOME INSECURITY: IN THE LAST 12 MONTHS, WAS THERE A TIME WHEN YOU WERE NOT ABLE TO PAY THE MORTGAGE OR RENT ON TIME?: NO

## 2025-08-14 SDOH — ECONOMIC STABILITY: FOOD INSECURITY: WITHIN THE PAST 12 MONTHS, YOU WORRIED THAT YOUR FOOD WOULD RUN OUT BEFORE YOU GOT MONEY TO BUY MORE.: NEVER TRUE

## 2025-08-14 SDOH — ECONOMIC STABILITY: TRANSPORTATION INSECURITY
IN THE PAST 12 MONTHS, HAS THE LACK OF TRANSPORTATION KEPT YOU FROM MEDICAL APPOINTMENTS OR FROM GETTING MEDICATIONS?: NO

## 2025-08-14 ASSESSMENT — PATIENT HEALTH QUESTIONNAIRE - PHQ9
4. FEELING TIRED OR HAVING LITTLE ENERGY: NOT AT ALL
7. TROUBLE CONCENTRATING ON THINGS, SUCH AS READING THE NEWSPAPER OR WATCHING TELEVISION: NOT AT ALL
SUM OF ALL RESPONSES TO PHQ QUESTIONS 1-9: 1
10. IF YOU CHECKED OFF ANY PROBLEMS, HOW DIFFICULT HAVE THESE PROBLEMS MADE IT FOR YOU TO DO YOUR WORK, TAKE CARE OF THINGS AT HOME, OR GET ALONG WITH OTHER PEOPLE: NOT DIFFICULT AT ALL
4. FEELING TIRED OR HAVING LITTLE ENERGY: NOT AT ALL
6. FEELING BAD ABOUT YOURSELF - OR THAT YOU ARE A FAILURE OR HAVE LET YOURSELF OR YOUR FAMILY DOWN: NOT AT ALL
9. THOUGHTS THAT YOU WOULD BE BETTER OFF DEAD, OR OF HURTING YOURSELF: NOT AT ALL
3. TROUBLE FALLING OR STAYING ASLEEP: SEVERAL DAYS
6. FEELING BAD ABOUT YOURSELF - OR THAT YOU ARE A FAILURE OR HAVE LET YOURSELF OR YOUR FAMILY DOWN: NOT AT ALL
10. IF YOU CHECKED OFF ANY PROBLEMS, HOW DIFFICULT HAVE THESE PROBLEMS MADE IT FOR YOU TO DO YOUR WORK, TAKE CARE OF THINGS AT HOME, OR GET ALONG WITH OTHER PEOPLE: NOT DIFFICULT AT ALL
2. FEELING DOWN, DEPRESSED OR HOPELESS: NOT AT ALL
SUM OF ALL RESPONSES TO PHQ QUESTIONS 1-9: 1
1. LITTLE INTEREST OR PLEASURE IN DOING THINGS: NOT AT ALL
5. POOR APPETITE OR OVEREATING: NOT AT ALL
SUM OF ALL RESPONSES TO PHQ QUESTIONS 1-9: 1
8. MOVING OR SPEAKING SO SLOWLY THAT OTHER PEOPLE COULD HAVE NOTICED. OR THE OPPOSITE, BEING SO FIGETY OR RESTLESS THAT YOU HAVE BEEN MOVING AROUND A LOT MORE THAN USUAL: NOT AT ALL
7. TROUBLE CONCENTRATING ON THINGS, SUCH AS READING THE NEWSPAPER OR WATCHING TELEVISION: NOT AT ALL
1. LITTLE INTEREST OR PLEASURE IN DOING THINGS: NOT AT ALL
3. TROUBLE FALLING OR STAYING ASLEEP: SEVERAL DAYS
8. MOVING OR SPEAKING SO SLOWLY THAT OTHER PEOPLE COULD HAVE NOTICED. OR THE OPPOSITE - BEING SO FIDGETY OR RESTLESS THAT YOU HAVE BEEN MOVING AROUND A LOT MORE THAN USUAL: NOT AT ALL
2. FEELING DOWN, DEPRESSED OR HOPELESS: NOT AT ALL
SUM OF ALL RESPONSES TO PHQ QUESTIONS 1-9: 1
5. POOR APPETITE OR OVEREATING: NOT AT ALL
9. THOUGHTS THAT YOU WOULD BE BETTER OFF DEAD, OR OF HURTING YOURSELF: NOT AT ALL
SUM OF ALL RESPONSES TO PHQ QUESTIONS 1-9: 1

## 2025-08-18 ENCOUNTER — OFFICE VISIT (OUTPATIENT)
Dept: PRIMARY CARE CLINIC | Age: 35
End: 2025-08-18
Payer: COMMERCIAL

## 2025-08-18 VITALS
TEMPERATURE: 98.9 F | SYSTOLIC BLOOD PRESSURE: 106 MMHG | OXYGEN SATURATION: 99 % | HEART RATE: 69 BPM | RESPIRATION RATE: 16 BRPM | BODY MASS INDEX: 24.73 KG/M2 | DIASTOLIC BLOOD PRESSURE: 70 MMHG | WEIGHT: 153.2 LBS

## 2025-08-18 DIAGNOSIS — Z00.00 WELLNESS EXAMINATION: ICD-10-CM

## 2025-08-18 DIAGNOSIS — E55.9 VITAMIN D DEFICIENCY: Primary | ICD-10-CM

## 2025-08-18 DIAGNOSIS — G89.29 CHRONIC MIDLINE LOW BACK PAIN WITHOUT SCIATICA: ICD-10-CM

## 2025-08-18 DIAGNOSIS — M54.50 CHRONIC MIDLINE LOW BACK PAIN WITHOUT SCIATICA: ICD-10-CM

## 2025-08-18 PROCEDURE — 99214 OFFICE O/P EST MOD 30 MIN: CPT | Performed by: NURSE PRACTITIONER

## 2025-08-18 PROCEDURE — G2211 COMPLEX E/M VISIT ADD ON: HCPCS | Performed by: NURSE PRACTITIONER

## 2025-08-18 RX ORDER — ERGOCALCIFEROL 1.25 MG/1
50000 CAPSULE, LIQUID FILLED ORAL WEEKLY
Qty: 12 CAPSULE | Refills: 0 | Status: SHIPPED | OUTPATIENT
Start: 2025-08-18

## 2025-09-03 DIAGNOSIS — L70.9 ACNE, UNSPECIFIED ACNE TYPE: Primary | ICD-10-CM
